# Patient Record
Sex: FEMALE | Race: BLACK OR AFRICAN AMERICAN | NOT HISPANIC OR LATINO | Employment: FULL TIME | URBAN - METROPOLITAN AREA
[De-identification: names, ages, dates, MRNs, and addresses within clinical notes are randomized per-mention and may not be internally consistent; named-entity substitution may affect disease eponyms.]

---

## 2019-04-19 ENCOUNTER — OFFICE VISIT CONVERTED (OUTPATIENT)
Dept: GASTROENTEROLOGY | Facility: CLINIC | Age: 39
End: 2019-04-19
Attending: PHYSICIAN ASSISTANT

## 2019-06-20 ENCOUNTER — HOSPITAL ENCOUNTER (OUTPATIENT)
Dept: LAB | Facility: HOSPITAL | Age: 39
Discharge: HOME | End: 2019-06-20
Attending: INTERNAL MEDICINE

## 2019-06-20 LAB
ALBUMIN SERPL-MCNC: 4 G/DL (ref 3.5–5)
ALBUMIN/GLOB SERPL: 1.1 {RATIO} (ref 1.4–2.6)
ALP SERPL-CCNC: 51 U/L (ref 42–98)
ALT SERPL-CCNC: 14 U/L (ref 10–40)
ANION GAP SERPL CALC-SCNC: 17 MMOL/L (ref 8–19)
AST SERPL-CCNC: 19 U/L (ref 15–50)
BASOPHILS # BLD AUTO: 0.02 10*3/UL (ref 0–0.2)
BASOPHILS NFR BLD AUTO: 0.3 % (ref 0–3)
BILIRUB SERPL-MCNC: 0.39 MG/DL (ref 0.2–1.3)
BNP SERPL-MCNC: 216 PG/ML (ref 0–450)
BUN SERPL-MCNC: 13 MG/DL (ref 5–25)
BUN/CREAT SERPL: 14 {RATIO} (ref 6–20)
CALCIUM SERPL-MCNC: 9.5 MG/DL (ref 8.7–10.4)
CHLORIDE SERPL-SCNC: 97 MMOL/L (ref 99–111)
CONV ABS IMM GRAN: 0.02 10*3/UL (ref 0–0.2)
CONV CO2: 28 MMOL/L (ref 22–32)
CONV IMMATURE GRAN: 0.3 % (ref 0–1.8)
CONV TOTAL PROTEIN: 7.7 G/DL (ref 6.3–8.2)
CREAT UR-MCNC: 0.91 MG/DL (ref 0.5–0.9)
DEPRECATED RDW RBC AUTO: 45.3 FL (ref 36.4–46.3)
EOSINOPHIL # BLD AUTO: 0.04 10*3/UL (ref 0–0.7)
EOSINOPHIL # BLD AUTO: 0.5 % (ref 0–7)
ERYTHROCYTE [DISTWIDTH] IN BLOOD BY AUTOMATED COUNT: 14.6 % (ref 11.7–14.4)
GFR SERPLBLD BASED ON 1.73 SQ M-ARVRAT: >60 ML/MIN/{1.73_M2}
GLOBULIN UR ELPH-MCNC: 3.7 G/DL (ref 2–3.5)
GLUCOSE SERPL-MCNC: 83 MG/DL (ref 65–99)
HBA1C MFR BLD: 11.6 G/DL (ref 12–16)
HCT VFR BLD AUTO: 36.7 % (ref 37–47)
LYMPHOCYTES # BLD AUTO: 2.47 10*3/UL (ref 1–5)
MCH RBC QN AUTO: 27 PG (ref 27–31)
MCHC RBC AUTO-ENTMCNC: 31.6 G/DL (ref 33–37)
MCV RBC AUTO: 85.5 FL (ref 81–99)
MONOCYTES # BLD AUTO: 0.52 10*3/UL (ref 0.2–1.2)
MONOCYTES NFR BLD AUTO: 7.1 % (ref 3–10)
NEUTROPHILS # BLD AUTO: 4.26 10*3/UL (ref 2–8)
NEUTROPHILS NFR BLD AUTO: 58.1 % (ref 30–85)
NRBC CBCN: 0 % (ref 0–0.7)
OSMOLALITY SERPL CALC.SUM OF ELEC: 287 MOSM/KG (ref 273–304)
PLATELET # BLD AUTO: 241 10*3/UL (ref 130–400)
PMV BLD AUTO: 10.7 FL (ref 9.4–12.3)
POTASSIUM SERPL-SCNC: 2.8 MMOL/L (ref 3.5–5.3)
RBC # BLD AUTO: 4.29 10*6/UL (ref 4.2–5.4)
SODIUM SERPL-SCNC: 139 MMOL/L (ref 135–147)
T4 FREE SERPL-MCNC: 1.3 NG/DL (ref 0.9–1.8)
TSH SERPL-ACNC: 0.39 M[IU]/L (ref 0.27–4.2)
VARIANT LYMPHS NFR BLD MANUAL: 33.7 % (ref 20–45)
WBC # BLD AUTO: 7.33 10*3/UL (ref 4.8–10.8)

## 2020-08-17 ENCOUNTER — HOSPITAL ENCOUNTER (OUTPATIENT)
Dept: OTHER | Facility: HOSPITAL | Age: 40
Discharge: HOME | End: 2020-08-17
Attending: INTERNAL MEDICINE

## 2020-08-18 LAB
ALBUMIN SERPL-MCNC: 4.3 G/DL (ref 3.5–5)
ALBUMIN/GLOB SERPL: 1.3 {RATIO} (ref 1.4–2.6)
ALP SERPL-CCNC: 43 U/L (ref 42–98)
ALT SERPL-CCNC: 14 U/L (ref 10–40)
ANION GAP SERPL CALC-SCNC: 18 MMOL/L (ref 8–19)
AST SERPL-CCNC: 23 U/L (ref 15–50)
BILIRUB SERPL-MCNC: 0.34 MG/DL (ref 0.2–1.3)
BUN SERPL-MCNC: 15 MG/DL (ref 5–25)
BUN/CREAT SERPL: 17 {RATIO} (ref 6–20)
CALCIUM SERPL-MCNC: 10.1 MG/DL (ref 8.7–10.4)
CHLORIDE SERPL-SCNC: 100 MMOL/L (ref 99–111)
CHOLEST SERPL-MCNC: 195 MG/DL (ref 107–200)
CHOLEST/HDLC SERPL: 3.1 {RATIO} (ref 3–6)
CONV CO2: 23 MMOL/L (ref 22–32)
CONV TOTAL PROTEIN: 7.6 G/DL (ref 6.3–8.2)
CREAT UR-MCNC: 0.88 MG/DL (ref 0.5–0.9)
GFR SERPLBLD BASED ON 1.73 SQ M-ARVRAT: >60 ML/MIN/{1.73_M2}
GLOBULIN UR ELPH-MCNC: 3.3 G/DL (ref 2–3.5)
GLUCOSE SERPL-MCNC: 69 MG/DL (ref 65–99)
HDLC SERPL-MCNC: 62 MG/DL (ref 40–60)
LDLC SERPL CALC-MCNC: 125 MG/DL (ref 70–100)
OSMOLALITY SERPL CALC.SUM OF ELEC: 283 MOSM/KG (ref 273–304)
POTASSIUM SERPL-SCNC: 4.3 MMOL/L (ref 3.5–5.3)
SODIUM SERPL-SCNC: 137 MMOL/L (ref 135–147)
TRIGL SERPL-MCNC: 41 MG/DL (ref 40–150)
TSH SERPL-ACNC: 0.35 M[IU]/L (ref 0.27–4.2)
VLDLC SERPL-MCNC: 8 MG/DL (ref 5–37)

## 2021-05-15 VITALS
RESPIRATION RATE: 16 BRPM | SYSTOLIC BLOOD PRESSURE: 181 MMHG | HEART RATE: 66 BPM | DIASTOLIC BLOOD PRESSURE: 100 MMHG | WEIGHT: 293 LBS | OXYGEN SATURATION: 100 % | HEIGHT: 69 IN | BODY MASS INDEX: 43.4 KG/M2

## 2021-07-02 ENCOUNTER — TRANSCRIBE ORDERS (OUTPATIENT)
Dept: ADMINISTRATIVE | Facility: HOSPITAL | Age: 41
End: 2021-07-02

## 2021-07-02 DIAGNOSIS — Z12.31 ENCOUNTER FOR MAMMOGRAM TO ESTABLISH BASELINE MAMMOGRAM: Primary | ICD-10-CM

## 2021-08-25 ENCOUNTER — HOSPITAL ENCOUNTER (OUTPATIENT)
Dept: MAMMOGRAPHY | Facility: HOSPITAL | Age: 41
Discharge: HOME | End: 2021-08-25
Admitting: NURSE PRACTITIONER

## 2021-08-25 DIAGNOSIS — Z12.31 ENCOUNTER FOR MAMMOGRAM TO ESTABLISH BASELINE MAMMOGRAM: ICD-10-CM

## 2021-08-25 PROCEDURE — 77067 SCR MAMMO BI INCL CAD: CPT | Performed by: RADIOLOGY

## 2021-08-25 PROCEDURE — 77063 BREAST TOMOSYNTHESIS BI: CPT

## 2021-08-25 PROCEDURE — 77067 SCR MAMMO BI INCL CAD: CPT

## 2021-08-25 PROCEDURE — 77063 BREAST TOMOSYNTHESIS BI: CPT | Performed by: RADIOLOGY

## 2023-02-28 ENCOUNTER — APPOINTMENT (OUTPATIENT)
Dept: GENERAL RADIOLOGY | Facility: HOSPITAL | Age: 43
End: 2023-02-28
Payer: COMMERCIAL

## 2023-02-28 ENCOUNTER — HOSPITAL ENCOUNTER (EMERGENCY)
Facility: HOSPITAL | Age: 43
Discharge: HOME | End: 2023-03-01
Attending: EMERGENCY MEDICINE | Admitting: EMERGENCY MEDICINE
Payer: COMMERCIAL

## 2023-02-28 DIAGNOSIS — E87.6 HYPOKALEMIA: ICD-10-CM

## 2023-02-28 DIAGNOSIS — I10 UNCONTROLLED HYPERTENSION: ICD-10-CM

## 2023-02-28 DIAGNOSIS — R00.2 PALPITATIONS: Primary | ICD-10-CM

## 2023-02-28 LAB
ALBUMIN SERPL-MCNC: 4.7 G/DL (ref 3.5–5.2)
ALBUMIN/GLOB SERPL: 1.2 G/DL
ALP SERPL-CCNC: 54 U/L (ref 39–117)
ALT SERPL W P-5'-P-CCNC: 14 U/L (ref 1–33)
ANION GAP SERPL CALCULATED.3IONS-SCNC: 11.4 MMOL/L (ref 5–15)
AST SERPL-CCNC: 19 U/L (ref 1–32)
BASOPHILS # BLD AUTO: 0.01 10*3/MM3 (ref 0–0.2)
BASOPHILS NFR BLD AUTO: 0.1 % (ref 0–1.5)
BILIRUB SERPL-MCNC: 0.2 MG/DL (ref 0–1.2)
BUN SERPL-MCNC: 14 MG/DL (ref 6–20)
BUN/CREAT SERPL: 16.3 (ref 7–25)
CALCIUM SPEC-SCNC: 11.2 MG/DL (ref 8.6–10.5)
CHLORIDE SERPL-SCNC: 97 MMOL/L (ref 98–107)
CO2 SERPL-SCNC: 31.6 MMOL/L (ref 22–29)
CREAT SERPL-MCNC: 0.86 MG/DL (ref 0.57–1)
DEPRECATED RDW RBC AUTO: 46.8 FL (ref 37–54)
EGFRCR SERPLBLD CKD-EPI 2021: 85.6 ML/MIN/1.73
EOSINOPHIL # BLD AUTO: 0.06 10*3/MM3 (ref 0–0.4)
EOSINOPHIL NFR BLD AUTO: 0.7 % (ref 0.3–6.2)
ERYTHROCYTE [DISTWIDTH] IN BLOOD BY AUTOMATED COUNT: 15.8 % (ref 12.3–15.4)
GLOBULIN UR ELPH-MCNC: 3.9 GM/DL
GLUCOSE SERPL-MCNC: 92 MG/DL (ref 65–99)
HCT VFR BLD AUTO: 35.6 % (ref 34–46.6)
HGB BLD-MCNC: 11.6 G/DL (ref 12–15.9)
HOLD SPECIMEN: NORMAL
HOLD SPECIMEN: NORMAL
IMM GRANULOCYTES # BLD AUTO: 0.03 10*3/MM3 (ref 0–0.05)
IMM GRANULOCYTES NFR BLD AUTO: 0.4 % (ref 0–0.5)
LYMPHOCYTES # BLD AUTO: 2.36 10*3/MM3 (ref 0.7–3.1)
LYMPHOCYTES NFR BLD AUTO: 28.8 % (ref 19.6–45.3)
MAGNESIUM SERPL-MCNC: 1.7 MG/DL (ref 1.6–2.6)
MCH RBC QN AUTO: 26.5 PG (ref 26.6–33)
MCHC RBC AUTO-ENTMCNC: 32.6 G/DL (ref 31.5–35.7)
MCV RBC AUTO: 81.5 FL (ref 79–97)
MONOCYTES # BLD AUTO: 0.5 10*3/MM3 (ref 0.1–0.9)
MONOCYTES NFR BLD AUTO: 6.1 % (ref 5–12)
NEUTROPHILS NFR BLD AUTO: 5.23 10*3/MM3 (ref 1.7–7)
NEUTROPHILS NFR BLD AUTO: 63.9 % (ref 42.7–76)
NRBC BLD AUTO-RTO: 0 /100 WBC (ref 0–0.2)
PLATELET # BLD AUTO: 306 10*3/MM3 (ref 140–450)
PMV BLD AUTO: 9.5 FL (ref 6–12)
POTASSIUM SERPL-SCNC: 2.8 MMOL/L (ref 3.5–5.2)
PROT SERPL-MCNC: 8.6 G/DL (ref 6–8.5)
RBC # BLD AUTO: 4.37 10*6/MM3 (ref 3.77–5.28)
SODIUM SERPL-SCNC: 140 MMOL/L (ref 136–145)
TROPONIN T SERPL HS-MCNC: 11 NG/L
WBC NRBC COR # BLD: 8.19 10*3/MM3 (ref 3.4–10.8)
WHOLE BLOOD HOLD COAG: NORMAL
WHOLE BLOOD HOLD SPECIMEN: NORMAL

## 2023-02-28 PROCEDURE — 83735 ASSAY OF MAGNESIUM: CPT | Performed by: EMERGENCY MEDICINE

## 2023-02-28 PROCEDURE — 96374 THER/PROPH/DIAG INJ IV PUSH: CPT

## 2023-02-28 PROCEDURE — 93005 ELECTROCARDIOGRAM TRACING: CPT | Performed by: EMERGENCY MEDICINE

## 2023-02-28 PROCEDURE — 85025 COMPLETE CBC W/AUTO DIFF WBC: CPT | Performed by: EMERGENCY MEDICINE

## 2023-02-28 PROCEDURE — 80053 COMPREHEN METABOLIC PANEL: CPT | Performed by: EMERGENCY MEDICINE

## 2023-02-28 PROCEDURE — 84484 ASSAY OF TROPONIN QUANT: CPT | Performed by: EMERGENCY MEDICINE

## 2023-02-28 PROCEDURE — 93005 ELECTROCARDIOGRAM TRACING: CPT

## 2023-02-28 PROCEDURE — 93010 ELECTROCARDIOGRAM REPORT: CPT | Performed by: SPECIALIST

## 2023-02-28 PROCEDURE — 71045 X-RAY EXAM CHEST 1 VIEW: CPT

## 2023-02-28 PROCEDURE — 25010000002 HYDRALAZINE PER 20 MG: Performed by: EMERGENCY MEDICINE

## 2023-02-28 PROCEDURE — 36415 COLL VENOUS BLD VENIPUNCTURE: CPT | Performed by: EMERGENCY MEDICINE

## 2023-02-28 PROCEDURE — 99284 EMERGENCY DEPT VISIT MOD MDM: CPT

## 2023-02-28 RX ORDER — HYDRALAZINE HYDROCHLORIDE 10 MG/1
10 TABLET, FILM COATED ORAL 4 TIMES DAILY
Qty: 28 TABLET | Refills: 0 | Status: SHIPPED | OUTPATIENT
Start: 2023-02-28 | End: 2023-03-22

## 2023-02-28 RX ORDER — SODIUM CHLORIDE 0.9 % (FLUSH) 0.9 %
10 SYRINGE (ML) INJECTION AS NEEDED
Status: DISCONTINUED | OUTPATIENT
Start: 2023-02-28 | End: 2023-03-01 | Stop reason: HOSPADM

## 2023-02-28 RX ORDER — POTASSIUM CHLORIDE 750 MG/1
40 CAPSULE, EXTENDED RELEASE ORAL ONCE
Status: COMPLETED | OUTPATIENT
Start: 2023-02-28 | End: 2023-02-28

## 2023-02-28 RX ORDER — CLONIDINE HYDROCHLORIDE 0.1 MG/1
TABLET ORAL
COMMUNITY
Start: 2023-02-23 | End: 2023-03-22

## 2023-02-28 RX ORDER — HYDROCHLOROTHIAZIDE 25 MG/1
TABLET ORAL
COMMUNITY
Start: 2023-02-12 | End: 2023-03-22

## 2023-02-28 RX ORDER — AMLODIPINE BESYLATE 10 MG/1
TABLET ORAL
COMMUNITY
Start: 2023-02-19 | End: 2023-03-22 | Stop reason: SDUPTHER

## 2023-02-28 RX ORDER — HYDRALAZINE HYDROCHLORIDE 20 MG/ML
20 INJECTION INTRAMUSCULAR; INTRAVENOUS ONCE
Status: COMPLETED | OUTPATIENT
Start: 2023-02-28 | End: 2023-02-28

## 2023-02-28 RX ORDER — METOPROLOL SUCCINATE 50 MG/1
100 TABLET, EXTENDED RELEASE ORAL DAILY
COMMUNITY
Start: 2023-02-05

## 2023-02-28 RX ORDER — FERROUS SULFATE 325(65) MG
1 TABLET ORAL 3 TIMES DAILY
COMMUNITY
Start: 2022-12-02

## 2023-02-28 RX ORDER — LISINOPRIL 40 MG/1
TABLET ORAL
COMMUNITY
Start: 2023-02-27

## 2023-02-28 RX ADMIN — POTASSIUM CHLORIDE 40 MEQ: 10 CAPSULE, COATED, EXTENDED RELEASE ORAL at 23:58

## 2023-02-28 RX ADMIN — HYDRALAZINE HYDROCHLORIDE 20 MG: 20 INJECTION, SOLUTION INTRAMUSCULAR; INTRAVENOUS at 23:56

## 2023-03-01 VITALS
HEIGHT: 69 IN | HEART RATE: 66 BPM | DIASTOLIC BLOOD PRESSURE: 84 MMHG | OXYGEN SATURATION: 98 % | WEIGHT: 293 LBS | SYSTOLIC BLOOD PRESSURE: 148 MMHG | BODY MASS INDEX: 43.4 KG/M2 | TEMPERATURE: 98.3 F | RESPIRATION RATE: 20 BRPM

## 2023-03-01 LAB — QT INTERVAL: 422 MS

## 2023-03-01 NOTE — ED PROVIDER NOTES
Time: 10:26 PM EST  Date of encounter:  2023  Independent Historian/Clinical History and Information was obtained by:   Patient  Chief Complaint: Nausea and palpitations    History is limited by: N/A    History of Present Illness:  Patient is a 43 y.o. year old female who presents to the emergency department for evaluation of nausea and palpitations. Pt states she was placed on new BP medication clonidine and feels she is dehydrated and is having palpitations. Pt also notes some lightheadedness. Pt has been taking this medicine for a couple of weeks now. Pt is currently prescribed with 5 HTN medications. Pt is still making urine.     HPI    Patient Care Team  Primary Care Provider: Rod Rain MD    Past Medical History:     Allergies   Allergen Reactions   • Penicillins Swelling     Past Medical History:   Diagnosis Date   • Anemia    • Hypertension      Past Surgical History:   Procedure Laterality Date   •  SECTION     • TUBAL ABDOMINAL LIGATION       History reviewed. No pertinent family history.    Home Medications:  Prior to Admission medications    Medication Sig Start Date End Date Taking? Authorizing Provider   amLODIPine (NORVASC) 10 MG tablet TAKE 1 TABLET BY MOUTH EVERY DAY FOR 90 DAYS 23   Jazmyn Sargent MD   cloNIDine (CATAPRES) 0.1 MG tablet TAKE 1 TABLET BY MOUTH TWICE A DAY FOR 30 DAYS 23   Jazmyn Sargent MD   ferrous sulfate 325 (65 FE) MG tablet Take 1 tablet by mouth 3 (Three) Times a Day. 22   Jazmyn Sargent MD   hydroCHLOROthiazide (HYDRODIURIL) 25 MG tablet TAKE 1 TABLET BY MOUTH EVERY DAY FOR 90 DAYS 23   Jazmyn Sargent MD   lisinopril (PRINIVIL,ZESTRIL) 40 MG tablet  23   Jazmyn Sargent MD   metoprolol succinate XL (TOPROL-XL) 50 MG 24 hr tablet Take 2 tablets by mouth Daily. with food 23   Jazmyn Sargent MD        Social History:   Social History     Tobacco Use   • Smoking status: Never   Substance Use  "Topics   • Alcohol use: Never   • Drug use: Never         Review of Systems:  Review of Systems   Constitutional: Negative for chills and fever.   HENT: Negative for congestion, rhinorrhea and sore throat.    Eyes: Negative for pain and visual disturbance.   Respiratory: Negative for apnea, cough, chest tightness and shortness of breath.    Cardiovascular: Positive for palpitations. Negative for chest pain.   Gastrointestinal: Positive for nausea. Negative for abdominal pain, diarrhea and vomiting.   Genitourinary: Negative for difficulty urinating and dysuria.   Musculoskeletal: Negative for joint swelling and myalgias.   Skin: Negative for color change.   Neurological: Positive for light-headedness. Negative for seizures and headaches.   Psychiatric/Behavioral: Negative.    All other systems reviewed and are negative.       Physical Exam:  /84   Pulse 66   Temp 98.3 °F (36.8 °C) (Oral)   Resp 20   Ht 175.3 cm (69\")   Wt (!) 158 kg (347 lb 7.1 oz)   LMP 02/23/2023   SpO2 98%   BMI 51.31 kg/m²     Physical Exam  Vitals and nursing note reviewed.   Constitutional:       General: She is not in acute distress.     Appearance: Normal appearance. She is not toxic-appearing.   HENT:      Head: Normocephalic and atraumatic.      Jaw: There is normal jaw occlusion.   Eyes:      General: Lids are normal.      Extraocular Movements: Extraocular movements intact.      Conjunctiva/sclera: Conjunctivae normal.      Pupils: Pupils are equal, round, and reactive to light.   Cardiovascular:      Rate and Rhythm: Normal rate and regular rhythm.      Pulses: Normal pulses.      Heart sounds: Murmur heard.    Systolic murmur is present with a grade of 2/6.  Pulmonary:      Effort: Pulmonary effort is normal. No respiratory distress.      Breath sounds: Normal breath sounds. No wheezing or rhonchi.   Abdominal:      General: Abdomen is flat.      Palpations: Abdomen is soft.      Tenderness: There is no abdominal " tenderness. There is no guarding or rebound.   Musculoskeletal:         General: Normal range of motion.      Cervical back: Normal range of motion and neck supple.      Right lower le+ Edema present.      Left lower le+ Edema present.   Skin:     General: Skin is warm and dry.   Neurological:      Mental Status: She is alert and oriented to person, place, and time. Mental status is at baseline.   Psychiatric:         Mood and Affect: Mood normal.                  Procedures:  Procedures      Medical Decision Making:      Comorbidities that affect care:    Anemia, Hypertension    External Notes reviewed:    Previous ED Note      The following orders were placed and all results were independently analyzed by me:  Orders Placed This Encounter   Procedures   • XR Chest 1 View   • [place] Draw   • Comprehensive Metabolic Panel   • Magnesium   • Single High Sensitivity Troponin T   • CBC Auto Differential   • NPO Diet NPO Type: Strict NPO   • Undress & Gown   • Cardiac Monitoring   • Continuous Pulse Oximetry   • Oxygen Therapy- Nasal Cannula; 2 LPM; Titrate for SPO2: 92%   • ECG 12 Lead ED Triage Standing Order; Dysrhythmia   • Insert Peripheral IV   • CBC & Differential   • Green Top (Gel)   • Lavender Top   • Gold Top - SST   • Light Blue Top       Medications Given in the Emergency Department:  Medications   sodium chloride 0.9 % flush 10 mL (has no administration in time range)   potassium chloride (MICRO-K) CR capsule 40 mEq (40 mEq Oral Given 23)   hydrALAZINE (APRESOLINE) injection 20 mg (20 mg Intravenous Given 23)        ED Course:         Labs:    Lab Results (last 24 hours)     Procedure Component Value Units Date/Time    CBC & Differential [[id number]]  (Abnormal) Collected: 23    Specimen: Blood Updated: 23    Narrative:      The following orders were created for panel order CBC & Differential.  Procedure                               Abnormality          Status                     ---------                               -----------         ------                     CBC Auto Differential[[id number]]        Abnormal            Final result                 Please view results for these tests on the individual orders.    Comprehensive Metabolic Panel [[id number]]  (Abnormal) Collected: 02/28/23 2222    Specimen: Blood Updated: 02/28/23 2304     Glucose 92 mg/dL      BUN 14 mg/dL      Creatinine 0.86 mg/dL      Sodium 140 mmol/L      Potassium 2.8 mmol/L      Comment: Slight hemolysis detected by analyzer. Results may be affected.        Chloride 97 mmol/L      CO2 31.6 mmol/L      Calcium 11.2 mg/dL      Total Protein 8.6 g/dL      Albumin 4.7 g/dL      ALT (SGPT) 14 U/L      AST (SGOT) 19 U/L      Alkaline Phosphatase 54 U/L      Total Bilirubin 0.2 mg/dL      Globulin 3.9 gm/dL      A/G Ratio 1.2 g/dL      BUN/Creatinine Ratio 16.3     Anion Gap 11.4 mmol/L      eGFR 85.6 mL/min/1.73     Narrative:      GFR Normal >60  Chronic Kidney Disease <60  Kidney Failure <15      Magnesium [[id number]]  (Normal) Collected: 02/28/23 2222    Specimen: Blood Updated: 02/28/23 2304     Magnesium 1.7 mg/dL     Single High Sensitivity Troponin T [[id number]]  (Abnormal) Collected: 02/28/23 2222    Specimen: Blood Updated: 02/28/23 2303     HS Troponin T 11 ng/L     Narrative:      High Sensitive Troponin T Reference Range:  <10.0 ng/L- Negative Female for AMI  <15.0 ng/L- Negative Male for AMI  >=10 - Abnormal Female indicating possible myocardial injury.  >=15 - Abnormal Male indicating possible myocardial injury.   Clinicians would have to utilize clinical acumen, EKG, Troponin, and serial changes to determine if it is an Acute Myocardial Infarction or myocardial injury due to an underlying chronic condition.         CBC Auto Differential [[id number]]  (Abnormal) Collected: 02/28/23 2222    Specimen: Blood Updated: 02/28/23 2239     WBC 8.19 10*3/mm3      RBC 4.37 10*6/mm3       Hemoglobin 11.6 g/dL      Hematocrit 35.6 %      MCV 81.5 fL      MCH 26.5 pg      MCHC 32.6 g/dL      RDW 15.8 %      RDW-SD 46.8 fl      MPV 9.5 fL      Platelets 306 10*3/mm3      Neutrophil % 63.9 %      Lymphocyte % 28.8 %      Monocyte % 6.1 %      Eosinophil % 0.7 %      Basophil % 0.1 %      Immature Grans % 0.4 %      Neutrophils, Absolute 5.23 10*3/mm3      Lymphocytes, Absolute 2.36 10*3/mm3      Monocytes, Absolute 0.50 10*3/mm3      Eosinophils, Absolute 0.06 10*3/mm3      Basophils, Absolute 0.01 10*3/mm3      Immature Grans, Absolute 0.03 10*3/mm3      nRBC 0.0 /100 WBC            Imaging:    XR Chest 1 View    Result Date: 2/28/2023  PROCEDURE: XR CHEST 1 VW  COMPARISON: 11/30/2013.  INDICATIONS: 43-year-old female w/ h/o dysrhythmia(s), cardiac palpitations (5 days), shortness of breath, dizziness, & headache.  FINDINGS:  A single AP (or PA) upright portable chest radiograph was performed.  Borderline cardiac enlargement is seen.  No acute infiltrate is appreciated.  No pleural effusion or pneumothorax is identified.  The thoracic aorta is atherosclerotic and mildly ectatic. There is pulmonary hypoinflation.  Otherwise, no significant interval change is seen since the prior study (or studies).        No acute infiltrate is appreciated.     Please note that portions of this note were completed with a voice recognition program.  JEAN PAUL MONTELONGO JR, MD       Electronically Signed and Approved By: JEAN PAUL MONTELONGO JR, MD on 2/28/2023 at 22:04                  Differential Diagnosis and Discussion:    Palpitations: Differential diagnosis includes but is not limited to anxiety, atrioventricular blocks, mitral valve disease, hypoxia, coronary artery disease, hypokalemia, anemia, fever, COPD, congestive heart failure, pericarditis, Milvia-Parkinson-White syndrome, pulmonary embolism, SVT, atrial fibrillation, atrial flutter, sinus tachycardia, thyrotoxicosis, and pheochromocytoma.    All labs were reviewed and  interpreted by me.  All X-rays were independently reviewed by me.    MDM  Number of Diagnoses or Management Options  Hypokalemia  Palpitations  Uncontrolled hypertension  Diagnosis management comments: In summary this is a 43-year-old female who presents to the emergency department for evaluation of palpitations, not feeling well.  CBC independently reviewed by me and shows no critical abnormalities.  CMP independently reviewed by me and shows no critical abnormalities except hypokalemia..  Magnesium unremarkable.  Patient is otherwise well-appearing in no acute distress.  I instructed her to stop clonidine as seems to be adversely affecting her and have started hydralazine on her.  Of also instructed her to follow-up with cardiology for further management of her uncontrolled hypertension.  Very strict return to ER and follow-up instructions have been provided to the patient.             Patient Care Considerations:    CONSULT: I considered consulting Cardiology, however Patient will see them as an outpatient.      Consultants/Shared Management Plan:    None    Social Determinants of Health:    Patient is independent, reliable, and has access to care.       Disposition and Care Coordination:    Discharged: The patient is suitable and stable for discharge with no need for consideration of observation or admission.    I have explained the patient´s condition, diagnoses and treatment plan based on the information available to me at this time. I have answered questions and addressed any concerns. The patient has a good  understanding of the patient´s diagnosis, condition, and treatment plan as can be expected at this point. The vital signs have been stable. The patient´s condition is stable and appropriate for discharge from the emergency department.      The patient will pursue further outpatient evaluation with the primary care physician or other designated or consulting physician as outlined in the discharge  instructions. They are agreeable to this plan of care and follow-up instructions have been explained in detail. The patient has received these instructions in written format and have expressed an understanding of the discharge instructions. The patient is aware that any significant change in condition or worsening of symptoms should prompt an immediate return to this or the closest emergency department or call to 911.  I have explained discharge medications and the need for follow up with the patient/caretakers. This was also printed in the discharge instructions. Patient was discharged with the following medications and follow up:      Medication List      New Prescriptions    hydrALAZINE 10 MG tablet  Commonly known as: APRESOLINE  Take 1 tablet by mouth 4 (Four) Times a Day for 7 days.           Where to Get Your Medications      These medications were sent to [facility]/pharmacy #[id number] - [place], [place] - [address] - [phone]  - [phone]   [address] [place] [address] [place] [address]    Hours: 24-hours Phone: [phone]   · hydrALAZINE 10 MG tablet      Viky Holbrook MD  [address] [place] [address]  [phone]    In 1 week         Final diagnoses:   Palpitations   Hypokalemia   Uncontrolled hypertension        ED Disposition     ED Disposition   Discharge    Condition   Stable    Comment   --             This medical record created using voice recognition software.        Documentation assistance provided by Asya Damian acting as scribe for No att. providers found. Information recorded by the scribe was done at my direction and has been verified and validated by me.          Asya Damian  02/28/23 5721       Mateo Price MD  03/01/23 0019

## 2023-03-22 ENCOUNTER — OFFICE VISIT (OUTPATIENT)
Dept: CARDIOLOGY | Facility: CLINIC | Age: 43
End: 2023-03-22
Payer: COMMERCIAL

## 2023-03-22 VITALS
HEART RATE: 58 BPM | SYSTOLIC BLOOD PRESSURE: 168 MMHG | BODY MASS INDEX: 43.4 KG/M2 | DIASTOLIC BLOOD PRESSURE: 105 MMHG | WEIGHT: 293 LBS | HEIGHT: 69 IN

## 2023-03-22 DIAGNOSIS — R06.09 DYSPNEA ON EXERTION: ICD-10-CM

## 2023-03-22 DIAGNOSIS — E66.01 MORBID (SEVERE) OBESITY DUE TO EXCESS CALORIES: ICD-10-CM

## 2023-03-22 DIAGNOSIS — I10 HYPERTENSION, ESSENTIAL: Primary | ICD-10-CM

## 2023-03-22 PROBLEM — K59.00 CONSTIPATION: Status: ACTIVE | Noted: 2023-03-22

## 2023-03-22 PROBLEM — D50.0 IRON DEFICIENCY ANEMIA DUE TO CHRONIC BLOOD LOSS: Status: ACTIVE | Noted: 2023-03-22

## 2023-03-22 PROBLEM — N92.4 PREMENOPAUSAL MENORRHAGIA: Status: ACTIVE | Noted: 2023-03-22

## 2023-03-22 PROBLEM — D50.9 MICROCYTIC ANEMIA: Status: ACTIVE | Noted: 2023-03-22

## 2023-03-22 PROCEDURE — 99204 OFFICE O/P NEW MOD 45 MIN: CPT | Performed by: INTERNAL MEDICINE

## 2023-03-22 RX ORDER — METOPROLOL SUCCINATE 25 MG/1
TABLET, EXTENDED RELEASE ORAL
COMMUNITY
End: 2023-03-22

## 2023-03-22 RX ORDER — HYDRALAZINE HYDROCHLORIDE 25 MG/1
50 TABLET, FILM COATED ORAL 3 TIMES DAILY
Qty: 270 TABLET | Refills: 3 | Status: SHIPPED | OUTPATIENT
Start: 2023-03-22 | End: 2023-03-29

## 2023-03-22 RX ORDER — LISINOPRIL 20 MG/1
TABLET ORAL
COMMUNITY
End: 2023-03-22

## 2023-03-22 RX ORDER — ACETAMINOPHEN 160 MG
TABLET,DISINTEGRATING ORAL EVERY 24 HOURS
COMMUNITY

## 2023-03-22 RX ORDER — HYDROCHLOROTHIAZIDE 25 MG/1
25 TABLET ORAL DAILY
Qty: 90 TABLET | Refills: 3 | Status: SHIPPED | OUTPATIENT
Start: 2023-03-22

## 2023-03-22 RX ORDER — POTASSIUM CHLORIDE 600 MG/1
TABLET, FILM COATED, EXTENDED RELEASE ORAL
COMMUNITY
Start: 2023-03-16

## 2023-03-22 RX ORDER — AMLODIPINE BESYLATE 10 MG/1
TABLET ORAL
COMMUNITY

## 2023-03-22 RX ORDER — DOCUSATE CALCIUM 240 MG
CAPSULE ORAL EVERY 24 HOURS
COMMUNITY

## 2023-03-22 NOTE — PROGRESS NOTES
Chief Complaint  Establish Care (ER told her to come over here for blood pressure )    Subjective            Kaity Merritt presents to [facility] CARDIOLOGY  History of Present Illness    43-year-old -American female.  She has no previous cardiac problems in the past.  She is referred for hypertension.  She has had hypertension for years.  Her home blood pressure readings despite recent medication changes have been in the 140s over 90s or higher.  She has mild dyspnea.  She was having some palpitations, but since stopping clonidine those have resolved.  She has some lower extremity edema but it has not been improved recently with compression stockings.    PMH  Past Medical History:   Diagnosis Date   • Anemia    • Hypertension          SURGICALHX  Past Surgical History:   Procedure Laterality Date   •  SECTION     • TUBAL ABDOMINAL LIGATION          SOC  Social History     Socioeconomic History   • Marital status:    Tobacco Use   • Smoking status: Never   • Smokeless tobacco: Never   Vaping Use   • Vaping Use: Never used   Substance and Sexual Activity   • Alcohol use: Never   • Drug use: Never   • Sexual activity: Defer         FAMHX  Family History   Problem Relation Age of Onset   • Hypertension Mother    • Hypertension Maternal Grandmother    • Heart disease Maternal Grandfather    • Heart attack Neg Hx    • Heart failure Neg Hx    • Hyperlipidemia Neg Hx           ALLERGY  Allergies   Allergen Reactions   • Penicillins Swelling        MEDSCURRENT    Current Outpatient Medications:   •  amLODIPine (NORVASC) 10 MG tablet, amlodipine 10 mg oral tablet take 1 tablet (10 mg) by oral route once daily   Active, Disp: , Rfl:   •  Cholecalciferol (Vitamin D3) 50 MCG (2000) capsule, Daily., Disp: , Rfl:   •  ferrous sulfate 325 (65 FE) MG tablet, Take 1 tablet by mouth 3 (Three) Times a Day., Disp: , Rfl:   •  hydrALAZINE (APRESOLINE) 25 MG tablet, Take 2 tablets by mouth 3  "(Three) Times a Day for 7 days., Disp: 270 tablet, Rfl: 3  •  hydroCHLOROthiazide (HYDRODIURIL) 25 MG tablet, Take 1 tablet by mouth Daily., Disp: 90 tablet, Rfl: 3  •  lisinopril (PRINIVIL,ZESTRIL) 40 MG tablet, , Disp: , Rfl:   •  metoprolol succinate XL (TOPROL-XL) 50 MG 24 hr tablet, Take 2 tablets by mouth Daily. with food, Disp: , Rfl:   •  potassium chloride (KLOR-CON) 8 MEQ CR tablet, TAKE 1 TABLET BY MOUTH THREE TIMES A DAY FOR 90 DAYS, Disp: , Rfl:   •  docusate calcium (SURFAK) 240 MG capsule, Daily. (Patient not taking: Reported on 3/22/2023), Disp: , Rfl:       Review of Systems   Constitutional: Positive for weight gain.   HENT: Negative.    Eyes: Negative.    Cardiovascular: Positive for dyspnea on exertion and leg swelling. Negative for chest pain.   Respiratory: Positive for shortness of breath.    Endocrine: Negative.    Hematologic/Lymphatic: Negative.    Skin: Negative.    Musculoskeletal: Negative.    Gastrointestinal: Negative.    Genitourinary: Negative.    Neurological: Negative.    Psychiatric/Behavioral: Negative.         Objective     BP (!) 168/105   Pulse 58   Ht 175.3 cm (69\")   Wt (!) 156 kg (343 lb 3.2 oz)   BMI 50.68 kg/m²       General Appearance:   · well developed  · well nourished, morbidly obese  HENT:   · oropharynx moist  · lips not cyanotic  Neck:  · thyroid not enlarged  · supple  Respiratory:  · no respiratory distress  · normal breath sounds  · no rales  Cardiovascular:  · no jugular venous distention  · regular rhythm  · apical impulse normal  · S1 normal, S2 normal  · no S3, no S4   · no murmur  · no rub, no thrill  · carotid pulses normal; no bruit  · pedal pulses normal  · lower extremity edema: Trace  Musculoskeletal:  · no clubbing of fingers.   · normocephalic, head atraumatic  Skin:   · warm, dry  Psychiatric:  · judgement and insight appropriate  · normal mood and affect      Result Review :     The following data was reviewed by: Darvin Calhoun, " MD on 03/22/2023:    CMP    CMP 2/28/23   Glucose 92   BUN 14   Creatinine 0.86   eGFR 85.6   Sodium 140   Potassium 2.8 (A)   Chloride 97 (A)   Calcium 11.2 (A)   Total Protein 8.6 (A)   Albumin 4.7   Globulin 3.9   Total Bilirubin 0.2   Alkaline Phosphatase 54   AST (SGOT) 19   ALT (SGPT) 14   Albumin/Globulin Ratio 1.2   BUN/Creatinine Ratio 16.3   Anion Gap 11.4   (A) Abnormal value       Comments are available for some flowsheets but are not being displayed.           CBC    CBC 2/28/23   WBC 8.19   RBC 4.37   Hemoglobin 11.6 (A)   Hematocrit 35.6   MCV 81.5   MCH 26.5 (A)   MCHC 32.6   RDW 15.8 (A)   Platelets 306   (A) Abnormal value                      Data reviewed: Primary care records reviewed, emergency room records reviewed.  EKG personally reviewed by me from February 28 showing sinus rhythm rate 64, LVH, no ST changes, no previous for comparison     Procedures           Assessment and Plan        ASSESSMENT:  Encounter Diagnoses   Name Primary?   • Hypertension, essential Yes   • Dyspnea on exertion    • Morbid (severe) obesity due to excess calories (HCC)          PLAN:    1.  Essential hypertension-uncontrolled.  Given her current medication regimen I am going to resume the hydralazine at 25 mg 3 times daily.  Continue hydrochlorothiazide, ACE inhibitor, beta-blocker, and amlodipine.  We discussed low-sodium nutrition.  Continue efforts to lose weight.  Blood pressure check will be scheduled in about 3 months otherwise  2.  Dyspnea on exertion-likely due to multiple factors, an echo will be scheduled to evaluate further.  3.  Morbid obesity due to excess calories-counseled, she is in the process of changing nutrition and has started losing a bit of weight already    Follow-up to be arranged          Patient was given instructions and counseling regarding her condition or for health maintenance advice. Please see specific information pulled into the AVS if appropriate.             CALI Calhoun,  MD  3/22/2023    10:00 EDT

## 2023-05-02 ENCOUNTER — HOSPITAL ENCOUNTER (OUTPATIENT)
Dept: CARDIOLOGY | Facility: HOSPITAL | Age: 43
Discharge: HOME | End: 2023-05-02
Admitting: INTERNAL MEDICINE
Payer: COMMERCIAL

## 2023-05-02 DIAGNOSIS — I10 HYPERTENSION, ESSENTIAL: ICD-10-CM

## 2023-05-02 DIAGNOSIS — R06.09 DYSPNEA ON EXERTION: ICD-10-CM

## 2023-05-02 PROCEDURE — 93306 TTE W/DOPPLER COMPLETE: CPT | Performed by: INTERNAL MEDICINE

## 2023-05-02 PROCEDURE — 93306 TTE W/DOPPLER COMPLETE: CPT

## 2023-05-03 LAB
ASCENDING AORTA: 3.5 CM
BH CV ECHO MEAS - AO MAX PG: 18 MMHG
BH CV ECHO MEAS - AO MEAN PG: 10 MMHG
BH CV ECHO MEAS - AO ROOT DIAM: 2.9 CM
BH CV ECHO MEAS - AO V2 MAX: 211 CM/SEC
BH CV ECHO MEAS - AO V2 VTI: 48.4 CM
BH CV ECHO MEAS - AVA(I,D): 2.17 CM2
BH CV ECHO MEAS - EDV(CUBED): 135 ML
BH CV ECHO MEAS - EDV(MOD-SP2): 99.1 ML
BH CV ECHO MEAS - EDV(MOD-SP4): 115 ML
BH CV ECHO MEAS - EF(MOD-BP): 63.9 %
BH CV ECHO MEAS - EF(MOD-SP2): 61.8 %
BH CV ECHO MEAS - EF(MOD-SP4): 69.4 %
BH CV ECHO MEAS - ESV(CUBED): 27.3 ML
BH CV ECHO MEAS - ESV(MOD-SP2): 37.9 ML
BH CV ECHO MEAS - ESV(MOD-SP4): 35.2 ML
BH CV ECHO MEAS - FS: 41.3 %
BH CV ECHO MEAS - IVS/LVPW: 1.11 CM
BH CV ECHO MEAS - IVSD: 1.75 CM
BH CV ECHO MEAS - LA DIMENSION: 4.8 CM
BH CV ECHO MEAS - LAT PEAK E' VEL: 12.8 CM/SEC
BH CV ECHO MEAS - LV DIASTOLIC VOL/BSA (35-75): 44.3 CM2
BH CV ECHO MEAS - LV MASS(C)D: 390.3 GRAMS
BH CV ECHO MEAS - LV MAX PG: 8.4 MMHG
BH CV ECHO MEAS - LV MEAN PG: 5 MMHG
BH CV ECHO MEAS - LV SYSTOLIC VOL/BSA (12-30): 13.5 CM2
BH CV ECHO MEAS - LV V1 MAX: 145 CM/SEC
BH CV ECHO MEAS - LV V1 VTI: 33.4 CM
BH CV ECHO MEAS - LVIDD: 5.1 CM
BH CV ECHO MEAS - LVIDS: 3 CM
BH CV ECHO MEAS - LVOT AREA: 3.1 CM2
BH CV ECHO MEAS - LVOT DIAM: 2 CM
BH CV ECHO MEAS - LVPWD: 1.57 CM
BH CV ECHO MEAS - MED PEAK E' VEL: 9.3 CM/SEC
BH CV ECHO MEAS - MV A MAX VEL: 60.7 CM/SEC
BH CV ECHO MEAS - MV DEC SLOPE: 493.5 CM/SEC2
BH CV ECHO MEAS - MV DEC TIME: 0.24 MSEC
BH CV ECHO MEAS - MV E MAX VEL: 94.6 CM/SEC
BH CV ECHO MEAS - MV E/A: 1.56
BH CV ECHO MEAS - MV MEAN PG: 2 MMHG
BH CV ECHO MEAS - MV P1/2T: 71.8 MSEC
BH CV ECHO MEAS - MV V2 VTI: 31.5 CM
BH CV ECHO MEAS - MVA(P1/2T): 3.1 CM2
BH CV ECHO MEAS - MVA(VTI): 3.3 CM2
BH CV ECHO MEAS - RAP SYSTOLE: 3 MMHG
BH CV ECHO MEAS - RVDD: 2.6 CM
BH CV ECHO MEAS - RVSP: 45.8 MMHG
BH CV ECHO MEAS - SI(MOD-SP2): 23.6 ML/M2
BH CV ECHO MEAS - SI(MOD-SP4): 30.7 ML/M2
BH CV ECHO MEAS - SV(LVOT): 104.9 ML
BH CV ECHO MEAS - SV(MOD-SP2): 61.2 ML
BH CV ECHO MEAS - SV(MOD-SP4): 79.8 ML
BH CV ECHO MEAS - TR MAX PG: 42.8 MMHG
BH CV ECHO MEAS - TR MAX VEL: 327 CM/SEC
BH CV ECHO MEASUREMENTS AVERAGE E/E' RATIO: 8.56
IVRT: 85 MSEC
LEFT ATRIUM VOLUME INDEX: 45 ML/M2
MAXIMAL PREDICTED HEART RATE: 176 BPM
STRESS TARGET HR: 150 BPM

## 2023-12-01 RX ORDER — HYDRALAZINE HYDROCHLORIDE 25 MG/1
TABLET, FILM COATED ORAL
Qty: 270 TABLET | Refills: 3 | OUTPATIENT
Start: 2023-12-01

## 2023-12-12 RX ORDER — HYDRALAZINE HYDROCHLORIDE 25 MG/1
TABLET, FILM COATED ORAL
Qty: 270 TABLET | Refills: 3 | OUTPATIENT
Start: 2023-12-12

## 2023-12-28 ENCOUNTER — TRANSCRIBE ORDERS (OUTPATIENT)
Dept: ADMINISTRATIVE | Facility: HOSPITAL | Age: 43
End: 2023-12-28

## 2023-12-28 DIAGNOSIS — R79.89 LOW THYROID STIMULATING HORMONE (TSH) LEVEL: Primary | ICD-10-CM

## 2024-02-21 ENCOUNTER — HOSPITAL ENCOUNTER (OUTPATIENT)
Dept: NUCLEAR MEDICINE | Facility: HOSPITAL | Age: 44
Discharge: HOME | End: 2024-02-21
Payer: COMMERCIAL

## 2024-02-21 DIAGNOSIS — R79.89 LOW THYROID STIMULATING HORMONE (TSH) LEVEL: ICD-10-CM

## 2024-02-21 PROCEDURE — A9516 IODINE I-123 SOD IODIDE MIC: HCPCS | Performed by: NURSE PRACTITIONER

## 2024-02-21 PROCEDURE — 0 SODIUM IODIDE 3.7 MBQ CAPSULE: Performed by: NURSE PRACTITIONER

## 2024-02-21 PROCEDURE — 78014 THYROID IMAGING W/BLOOD FLOW: CPT

## 2024-02-21 RX ORDER — SODIUM IODIDE I 123 100 UCI/1
1 CAPSULE, GELATIN COATED ORAL
Status: COMPLETED | OUTPATIENT
Start: 2024-02-21 | End: 2024-02-21

## 2024-02-21 RX ADMIN — SODIUM IODIDE I 123 1 CAPSULE: 100 CAPSULE, GELATIN COATED ORAL at 14:08

## 2024-02-22 ENCOUNTER — HOSPITAL ENCOUNTER (OUTPATIENT)
Dept: NUCLEAR MEDICINE | Facility: HOSPITAL | Age: 44
Discharge: HOME | End: 2024-02-22
Payer: COMMERCIAL

## 2024-02-22 ENCOUNTER — HOSPITAL ENCOUNTER (OUTPATIENT)
Dept: ULTRASOUND IMAGING | Facility: HOSPITAL | Age: 44
Discharge: HOME | End: 2024-02-22
Payer: COMMERCIAL

## 2024-02-22 DIAGNOSIS — R79.89 LOW THYROID STIMULATING HORMONE (TSH) LEVEL: ICD-10-CM

## 2024-02-22 PROCEDURE — 76536 US EXAM OF HEAD AND NECK: CPT

## 2024-04-19 ENCOUNTER — TRANSCRIBE ORDERS (OUTPATIENT)
Dept: ADMINISTRATIVE | Facility: HOSPITAL | Age: 44
End: 2024-04-19
Payer: COMMERCIAL

## 2024-04-19 DIAGNOSIS — Z12.31 SCREENING MAMMOGRAM FOR BREAST CANCER: Primary | ICD-10-CM

## 2024-06-25 ENCOUNTER — HOSPITAL ENCOUNTER (OUTPATIENT)
Dept: MAMMOGRAPHY | Facility: HOSPITAL | Age: 44
Discharge: HOME | End: 2024-06-25
Admitting: OBSTETRICS & GYNECOLOGY
Payer: COMMERCIAL

## 2024-06-25 DIAGNOSIS — Z12.31 SCREENING MAMMOGRAM FOR BREAST CANCER: ICD-10-CM

## 2024-06-25 PROCEDURE — 77067 SCR MAMMO BI INCL CAD: CPT

## 2024-06-25 PROCEDURE — 77063 BREAST TOMOSYNTHESIS BI: CPT

## 2024-07-15 ENCOUNTER — TRANSCRIBE ORDERS (OUTPATIENT)
Dept: ADMINISTRATIVE | Facility: HOSPITAL | Age: 44
End: 2024-07-15
Payer: COMMERCIAL

## 2024-07-15 DIAGNOSIS — N63.10 MASS OF RIGHT BREAST, UNSPECIFIED QUADRANT: Primary | ICD-10-CM

## 2024-11-04 ENCOUNTER — APPOINTMENT (OUTPATIENT)
Dept: CT IMAGING | Facility: HOSPITAL | Age: 44
End: 2024-11-04
Payer: COMMERCIAL

## 2024-11-04 ENCOUNTER — HOSPITAL ENCOUNTER (EMERGENCY)
Facility: HOSPITAL | Age: 44
Discharge: HOME | End: 2024-11-04
Attending: EMERGENCY MEDICINE | Admitting: EMERGENCY MEDICINE
Payer: COMMERCIAL

## 2024-11-04 VITALS
SYSTOLIC BLOOD PRESSURE: 170 MMHG | HEART RATE: 70 BPM | OXYGEN SATURATION: 99 % | TEMPERATURE: 98 F | RESPIRATION RATE: 18 BRPM | BODY MASS INDEX: 43.4 KG/M2 | WEIGHT: 293 LBS | HEIGHT: 69 IN | DIASTOLIC BLOOD PRESSURE: 88 MMHG

## 2024-11-04 DIAGNOSIS — N39.0 URINARY TRACT INFECTION WITHOUT HEMATURIA, SITE UNSPECIFIED: Primary | ICD-10-CM

## 2024-11-04 DIAGNOSIS — D25.9 UTERINE LEIOMYOMA, UNSPECIFIED LOCATION: ICD-10-CM

## 2024-11-04 LAB
ALBUMIN SERPL-MCNC: 4.2 G/DL (ref 3.5–5.2)
ALBUMIN/GLOB SERPL: 1.2 G/DL
ALP SERPL-CCNC: 43 U/L (ref 39–117)
ALT SERPL W P-5'-P-CCNC: 10 U/L (ref 1–33)
ANION GAP SERPL CALCULATED.3IONS-SCNC: 12.8 MMOL/L (ref 5–15)
AST SERPL-CCNC: 14 U/L (ref 1–32)
BACTERIA UR QL AUTO: ABNORMAL /HPF
BASOPHILS # BLD AUTO: 0.02 10*3/MM3 (ref 0–0.2)
BASOPHILS NFR BLD AUTO: 0.3 % (ref 0–1.5)
BILIRUB SERPL-MCNC: 0.3 MG/DL (ref 0–1.2)
BILIRUB UR QL STRIP: NEGATIVE
BUN SERPL-MCNC: 17 MG/DL (ref 6–20)
BUN/CREAT SERPL: 17.2 (ref 7–25)
CALCIUM SPEC-SCNC: 9.4 MG/DL (ref 8.6–10.5)
CHLORIDE SERPL-SCNC: 101 MMOL/L (ref 98–107)
CLARITY UR: CLEAR
CO2 SERPL-SCNC: 25.2 MMOL/L (ref 22–29)
COLOR UR: YELLOW
CREAT SERPL-MCNC: 0.99 MG/DL (ref 0.57–1)
DEPRECATED RDW RBC AUTO: 51.4 FL (ref 37–54)
EGFRCR SERPLBLD CKD-EPI 2021: 71.8 ML/MIN/1.73
EOSINOPHIL # BLD AUTO: 0.02 10*3/MM3 (ref 0–0.4)
EOSINOPHIL NFR BLD AUTO: 0.3 % (ref 0.3–6.2)
ERYTHROCYTE [DISTWIDTH] IN BLOOD BY AUTOMATED COUNT: 16.4 % (ref 12.3–15.4)
GLOBULIN UR ELPH-MCNC: 3.6 GM/DL
GLUCOSE SERPL-MCNC: 85 MG/DL (ref 65–99)
GLUCOSE UR STRIP-MCNC: NEGATIVE MG/DL
HCG INTACT+B SERPL-ACNC: <0.5 MIU/ML
HCT VFR BLD AUTO: 35 % (ref 34–46.6)
HGB BLD-MCNC: 10.9 G/DL (ref 12–15.9)
HGB UR QL STRIP.AUTO: NEGATIVE
HOLD SPECIMEN: NORMAL
HOLD SPECIMEN: NORMAL
HYALINE CASTS UR QL AUTO: ABNORMAL /LPF
IMM GRANULOCYTES # BLD AUTO: 0.01 10*3/MM3 (ref 0–0.05)
IMM GRANULOCYTES NFR BLD AUTO: 0.1 % (ref 0–0.5)
KETONES UR QL STRIP: NEGATIVE
LEUKOCYTE ESTERASE UR QL STRIP.AUTO: NEGATIVE
LIPASE SERPL-CCNC: 15 U/L (ref 13–60)
LYMPHOCYTES # BLD AUTO: 2.44 10*3/MM3 (ref 0.7–3.1)
LYMPHOCYTES NFR BLD AUTO: 32.1 % (ref 19.6–45.3)
MCH RBC QN AUTO: 26.6 PG (ref 26.6–33)
MCHC RBC AUTO-ENTMCNC: 31.1 G/DL (ref 31.5–35.7)
MCV RBC AUTO: 85.4 FL (ref 79–97)
MONOCYTES # BLD AUTO: 0.63 10*3/MM3 (ref 0.1–0.9)
MONOCYTES NFR BLD AUTO: 8.3 % (ref 5–12)
NEUTROPHILS NFR BLD AUTO: 4.47 10*3/MM3 (ref 1.7–7)
NEUTROPHILS NFR BLD AUTO: 58.9 % (ref 42.7–76)
NITRITE UR QL STRIP: NEGATIVE
NRBC BLD AUTO-RTO: 0 /100 WBC (ref 0–0.2)
PH UR STRIP.AUTO: 6.5 [PH] (ref 5–8)
PLATELET # BLD AUTO: 276 10*3/MM3 (ref 140–450)
PMV BLD AUTO: 9.7 FL (ref 6–12)
POTASSIUM SERPL-SCNC: 3.3 MMOL/L (ref 3.5–5.2)
PROT SERPL-MCNC: 7.8 G/DL (ref 6–8.5)
PROT UR QL STRIP: ABNORMAL
RBC # BLD AUTO: 4.1 10*6/MM3 (ref 3.77–5.28)
RBC # UR STRIP: ABNORMAL /HPF
REF LAB TEST METHOD: ABNORMAL
SODIUM SERPL-SCNC: 139 MMOL/L (ref 136–145)
SP GR UR STRIP: 1.02 (ref 1–1.03)
SQUAMOUS #/AREA URNS HPF: ABNORMAL /HPF
UROBILINOGEN UR QL STRIP: ABNORMAL
WBC # UR STRIP: ABNORMAL /HPF
WBC NRBC COR # BLD AUTO: 7.59 10*3/MM3 (ref 3.4–10.8)
WHOLE BLOOD HOLD COAG: NORMAL
WHOLE BLOOD HOLD SPECIMEN: NORMAL

## 2024-11-04 PROCEDURE — 36415 COLL VENOUS BLD VENIPUNCTURE: CPT

## 2024-11-04 PROCEDURE — 99284 EMERGENCY DEPT VISIT MOD MDM: CPT

## 2024-11-04 PROCEDURE — 81001 URINALYSIS AUTO W/SCOPE: CPT

## 2024-11-04 PROCEDURE — 80053 COMPREHEN METABOLIC PANEL: CPT

## 2024-11-04 PROCEDURE — 74176 CT ABD & PELVIS W/O CONTRAST: CPT

## 2024-11-04 PROCEDURE — 83690 ASSAY OF LIPASE: CPT

## 2024-11-04 PROCEDURE — 84702 CHORIONIC GONADOTROPIN TEST: CPT

## 2024-11-04 PROCEDURE — 85025 COMPLETE CBC W/AUTO DIFF WBC: CPT

## 2024-11-04 RX ORDER — NITROFURANTOIN 25; 75 MG/1; MG/1
100 CAPSULE ORAL 2 TIMES DAILY
Qty: 14 CAPSULE | Refills: 0 | Status: SHIPPED | OUTPATIENT
Start: 2024-11-04

## 2024-11-04 RX ORDER — METHOCARBAMOL 750 MG/1
1500 TABLET, FILM COATED ORAL 3 TIMES DAILY PRN
Qty: 30 TABLET | Refills: 0 | Status: SHIPPED | OUTPATIENT
Start: 2024-11-04

## 2024-11-04 RX ORDER — MULTIVIT-MIN/IRON/FOLIC ACID/K 18-600-40
1 CAPSULE ORAL DAILY
COMMUNITY

## 2024-11-04 RX ORDER — SODIUM CHLORIDE 0.9 % (FLUSH) 0.9 %
10 SYRINGE (ML) INJECTION AS NEEDED
Status: DISCONTINUED | OUTPATIENT
Start: 2024-11-04 | End: 2024-11-04 | Stop reason: HOSPADM

## 2024-11-04 NOTE — ED PROVIDER NOTES
Time: 3:50 PM EST  Date of encounter:  2024  Independent Historian/Clinical History and Information was obtained by:   Patient    History is limited by: N/A    Chief Complaint: Flank pain      History of Present Illness:  Patient is a 44 y.o. year old female who presents to the emergency department for evaluation of right sided flank pain onset Friday.  Patient states it radiates up her back.  She thought it was just a pulled muscle so she has taken ibuprofen with no relief.  admits to nausea.  Denies history of kidney stones, urinary symptoms or abdominal pain.      Patient Care Team  Primary Care Provider: Rod Rain MD    Past Medical History:     Allergies   Allergen Reactions    Penicillins Swelling     Past Medical History:   Diagnosis Date    Anemia     Hypertension      Past Surgical History:   Procedure Laterality Date     SECTION      TUBAL ABDOMINAL LIGATION       Family History   Problem Relation Age of Onset    Hypertension Mother     Hypertension Maternal Grandmother     Heart disease Maternal Grandfather     Heart attack Neg Hx     Heart failure Neg Hx     Hyperlipidemia Neg Hx        Home Medications:  Prior to Admission medications    Medication Sig Start Date End Date Taking? Authorizing Provider   amLODIPine (NORVASC) 10 MG tablet amlodipine 10 mg oral tablet take 1 tablet (10 mg) by oral route once daily   Active    ProviderJazmyn MD   Cholecalciferol (Vitamin D3) 50 MCG ( UT) capsule Daily.    Jazmyn Sargent MD   docusate calcium (SURFAK) 240 MG capsule Daily.  Patient not taking: Reported on 3/22/2023    Jazymn Sargent MD   ferrous sulfate 325 (65 FE) MG tablet Take 1 tablet by mouth 3 (Three) Times a Day. 22   Jazmyn Sargent MD   hydrALAZINE (APRESOLINE) 25 MG tablet Take 2 tablets by mouth 3 (Three) Times a Day for 7 days. 3/22/23 3/29/23  CALI Calhoun MD   hydroCHLOROthiazide (HYDRODIURIL) 25 MG tablet Take 1 tablet by mouth Daily.  "3/22/23   CALI Calhoun MD   lisinopril (PRINIVIL,ZESTRIL) 40 MG tablet  2/27/23   Jazmyn Sargent MD   metoprolol succinate XL (TOPROL-XL) 50 MG 24 hr tablet Take 2 tablets by mouth Daily. with food 2/5/23   Jazmyn Sargent MD   potassium chloride (KLOR-CON) 8 MEQ CR tablet TAKE 1 TABLET BY MOUTH THREE TIMES A DAY FOR 90 DAYS 3/16/23   Jazmyn Sargent MD        Social History:   Social History     Tobacco Use    Smoking status: Never    Smokeless tobacco: Never   Vaping Use    Vaping status: Never Used   Substance Use Topics    Alcohol use: Never    Drug use: Never         Review of Systems:  Review of Systems   Constitutional:  Negative for chills and fever.   HENT:  Negative for congestion, rhinorrhea and sore throat.    Eyes:  Negative for pain and visual disturbance.   Respiratory:  Negative for apnea, cough, chest tightness and shortness of breath.    Cardiovascular:  Negative for chest pain and palpitations.   Gastrointestinal:  Positive for nausea. Negative for abdominal pain, diarrhea and vomiting.   Genitourinary:  Positive for flank pain. Negative for difficulty urinating, dysuria and hematuria.   Musculoskeletal:  Negative for joint swelling and myalgias.   Skin:  Negative for color change.   Neurological:  Negative for seizures and headaches.   Psychiatric/Behavioral: Negative.     All other systems reviewed and are negative.       Physical Exam:  /98 (BP Location: Right arm)   Pulse 75   Temp 98 °F (36.7 °C) (Oral)   Resp 16   Ht 175.3 cm (69\")   Wt (!) 150 kg (330 lb 11 oz)   LMP 10/09/2024 (Approximate)   SpO2 98%   BMI 48.83 kg/m²     Physical Exam  Vitals and nursing note reviewed.   Constitutional:       General: She is not in acute distress.     Appearance: Normal appearance. She is not toxic-appearing.   HENT:      Head: Normocephalic and atraumatic.      Jaw: There is normal jaw occlusion.      Mouth/Throat:      Mouth: Mucous membranes are moist.   Eyes:      " General: Lids are normal.      Extraocular Movements: Extraocular movements intact.      Conjunctiva/sclera: Conjunctivae normal.      Pupils: Pupils are equal, round, and reactive to light.   Cardiovascular:      Rate and Rhythm: Normal rate and regular rhythm.      Pulses: Normal pulses.      Heart sounds: Normal heart sounds.   Pulmonary:      Effort: Pulmonary effort is normal. No respiratory distress.      Breath sounds: Normal breath sounds. No wheezing or rhonchi.   Abdominal:      General: Abdomen is flat. There is no distension.      Palpations: Abdomen is soft.      Tenderness: There is no abdominal tenderness. There is no guarding or rebound.   Musculoskeletal:         General: Normal range of motion.      Cervical back: Normal range of motion and neck supple.      Right lower leg: No edema.      Left lower leg: No edema.      Comments: Tenderness right lumbar paraspinal muscles   Skin:     General: Skin is warm and dry.   Neurological:      General: No focal deficit present.      Mental Status: She is alert and oriented to person, place, and time. Mental status is at baseline.   Psychiatric:         Mood and Affect: Mood normal.         Behavior: Behavior normal.                  Procedures:  Procedures      Medical Decision Making:      Comorbidities that affect care:    Hypertension    External Notes reviewed:    None      The following orders were placed and all results were independently analyzed by me:  Orders Placed This Encounter   Procedures    CT Abdomen Pelvis Stone Protocol    [place] Draw    Comprehensive Metabolic Panel    Lipase    Urinalysis With Microscopic If Indicated (No Culture) - Urine, Clean Catch    hCG, Quantitative, Pregnancy    CBC Auto Differential    Urinalysis, Microscopic Only - Urine, Clean Catch    NPO Diet NPO Type: Strict NPO    Undress & Gown    Insert Peripheral IV    CBC & Differential    Green Top (Gel)    Lavender Top    Gold Top - SST    Light Blue Top        Medications Given in the Emergency Department:  Medications   sodium chloride 0.9 % flush 10 mL (has no administration in time range)        ED Course:    ED Course as of 11/04/24 1937   Mon Nov 04, 2024   1551 --- PROVIDER IN TRIAGE NOTE ---    The patient was evaluated by Lito espino in triage. Orders were placed and the patient is currently awaiting disposition.    [AJ]      ED Course User Index  [AJ] Lito Horowitz PA-C       Labs:    Lab Results (last 24 hours)       Procedure Component Value Units Date/Time    CBC & Differential [[id number]]  (Abnormal) Collected: 11/04/24 1610    Specimen: Blood from Arm, Right Updated: 11/04/24 1639    Narrative:      The following orders were created for panel order CBC & Differential.  Procedure                               Abnormality         Status                     ---------                               -----------         ------                     CBC Auto Differential[[id number]]        Abnormal            Final result                 Please view results for these tests on the individual orders.    Comprehensive Metabolic Panel [[id number]]  (Abnormal) Collected: 11/04/24 1610    Specimen: Blood from Arm, Right Updated: 11/04/24 1706     Glucose 85 mg/dL      BUN 17 mg/dL      Creatinine 0.99 mg/dL      Sodium 139 mmol/L      Potassium 3.3 mmol/L      Chloride 101 mmol/L      CO2 25.2 mmol/L      Calcium 9.4 mg/dL      Total Protein 7.8 g/dL      Albumin 4.2 g/dL      ALT (SGPT) 10 U/L      AST (SGOT) 14 U/L      Alkaline Phosphatase 43 U/L      Total Bilirubin 0.3 mg/dL      Globulin 3.6 gm/dL      A/G Ratio 1.2 g/dL      BUN/Creatinine Ratio 17.2     Anion Gap 12.8 mmol/L      eGFR 71.8 mL/min/1.73     Narrative:      GFR Normal >60  Chronic Kidney Disease <60  Kidney Failure <15      Lipase [[id number]]  (Normal) Collected: 11/04/24 1610    Specimen: Blood from Arm, Right Updated: 11/04/24 1706     Lipase 15 U/L     hCG, Quantitative,  Pregnancy [[id number]] Collected: 11/04/24 1610    Specimen: Blood from Arm, Right Updated: 11/04/24 1657     HCG Quantitative <0.50 mIU/mL     Narrative:      HCG Ranges by Gestational Age    Females - non-pregnant premenopausal   </= 1mIU/mL HCG  Females - postmenopausal               </= 7mIU/mL HCG    3 Weeks       5.4   -      72 mIU/mL  4 Weeks      10.2   -     708 mIU/mL  5 Weeks       217   -   8,245 mIU/mL  6 Weeks       152   -  32,177 mIU/mL  7 Weeks     4,059   - 153,767 mIU/mL  8 Weeks    31,366   - 149,094 mIU/mL  9 Weeks    59,109   - 135,901 mIU/mL  10 Weeks   44,186   - 170,409 mIU/mL  12 Weeks   27,107   - 201,615 mIU/mL  14 Weeks   24,302   -  93,646 mIU/mL  15 Weeks   12,540   -  69,747 mIU/mL  16 Weeks    8,904   -  55,332 mIU/mL  17 Weeks    8,240   -  51,793 mIU/mL  18 Weeks    9,649   -  55,271 mIU/mL      CBC Auto Differential [[id number]]  (Abnormal) Collected: 11/04/24 1610    Specimen: Blood from Arm, Right Updated: 11/04/24 1639     WBC 7.59 10*3/mm3      RBC 4.10 10*6/mm3      Hemoglobin 10.9 g/dL      Hematocrit 35.0 %      MCV 85.4 fL      MCH 26.6 pg      MCHC 31.1 g/dL      RDW 16.4 %      RDW-SD 51.4 fl      MPV 9.7 fL      Platelets 276 10*3/mm3      Neutrophil % 58.9 %      Lymphocyte % 32.1 %      Monocyte % 8.3 %      Eosinophil % 0.3 %      Basophil % 0.3 %      Immature Grans % 0.1 %      Neutrophils, Absolute 4.47 10*3/mm3      Lymphocytes, Absolute 2.44 10*3/mm3      Monocytes, Absolute 0.63 10*3/mm3      Eosinophils, Absolute 0.02 10*3/mm3      Basophils, Absolute 0.02 10*3/mm3      Immature Grans, Absolute 0.01 10*3/mm3      nRBC 0.0 /100 WBC     Urinalysis With Microscopic If Indicated (No Culture) - Urine, Clean Catch [[id number]]  (Abnormal) Collected: 11/04/24 1659    Specimen: Urine, Clean Catch Updated: 11/04/24 1723     Color, UA Yellow     Appearance, UA Clear     pH, UA 6.5     Specific Gravity, UA 1.024     Glucose, UA Negative     Ketones, UA Negative      Bilirubin, UA Negative     Blood, UA Negative     Protein, UA 30 mg/dL (1+)     Leuk Esterase, UA Negative     Nitrite, UA Negative     Urobilinogen, UA 1.0 E.U./dL    Urinalysis, Microscopic Only - Urine, Clean Catch [[id number]]  (Abnormal) Collected: 11/04/24 1659    Specimen: Urine, Clean Catch Updated: 11/04/24 1723     RBC, UA 0-2 /HPF      WBC, UA 3-5 /HPF      Bacteria, UA 1+ /HPF      Squamous Epithelial Cells, UA 3-6 /HPF      Hyaline Casts, UA 0-2 /LPF      Methodology Automated Microscopy             Imaging:    CT Abdomen Pelvis Stone Protocol    Result Date: 11/4/2024  CT ABDOMEN PELVIS STONE PROTOCOL Date of Exam: 11/4/2024 5:37 PM EST Indication: r flank pain. Comparison: None available. Technique: Axial CT images were obtained of the abdomen and pelvis without the administration of contrast. Reconstructed coronal and sagittal images were also obtained. Automated exposure control and iterative construction methods were used. FINDINGS: Abdomen/Pelvis: Lower Chest: Limited imaging lung bases is grossly clear. No free air is noted below the diaphragm. Organs: 2 cm low-attenuation lesion in the periphery of the right hepatic lobe represent a cyst or hemangioma. Additional small 5 mm low-attenuation lesion also likely a cyst or hemangioma noted. Limited noncontrast imaging of the liver is otherwise grossly unremarkable in appearance. Gallbladder is grossly unremarkable on motion limited imaging. The limited noncontrast imaging of the spleen, pancreas and adrenal glands appear unremarkable in appearance. No obvious calculi along the visualized or expected course of the ureters. GI/Bowel: The bowel is displaced by an enlarged uterus. Stomach and the small bowel demonstrate no evidence of obstruction. Colon demonstrates no definite acute abnormality on limited imaging. Small lymph nodes within the mesentery likely reactive Pelvis: The uterus is enlarged with complex solid lesions containing soft tissue fluid  density and calcification suggesting fibroids with some necrotic changes. The largest of these measures approximately 12.9 x 11.2 cm on the left. This displaces the bladder. Ovaries are not well visualized, able to be distinguished from the uterus. Small amount of fluid is noted within the pelvis, nonspecific. Peritoneum/Retroperitoneum: The aorta is normal in caliber on limited noncontrast imaging. Bones/Soft Tissues: No acute osseous abnormality. Multilevel degenerative change of the spine     1. Enlarged uterus with several prominent fibroids suspected. The ovaries were unable to be visualized. 2. No evidence of renal calculi or obstructive uropathy. 3. Other findings as above Electronically Signed: Fabrice Ramos MD  11/4/2024 5:58 PM EST  Workstation ID: [id number]       Differential Diagnosis and Discussion:    Abdominal Pain: Based on the patient's signs and symptoms, I considered abdominal aortic aneurysm, small bowel obstruction, pancreatitis, acute cholecystitis, acute appendecitis, peptic ulcer disease, gastritis, colitis, endocrine disorders, irritable bowel syndrome and other differential diagnosis an etiology of the patient's abdominal pain.  Back Pain: The patient presents with back pain. My differential diagnosis includes but is not limited to acute spinal epidural abscess, acute spinal epidural bleed, cauda equina syndrome, abdominal aortic aneurysm, aortic dissection, kidney stone, pyelonephritis, musculoskeletal back pain, spinal fracture, and osteoarthritis.     All labs were reviewed and interpreted by me.  CT scan radiology impression was interpreted by me.    MDM  Number of Diagnoses or Management Options  Urinary tract infection without hematuria, site unspecified  Uterine leiomyoma, unspecified location  Diagnosis management comments: In summary this is a 44-year-old female who presents to the emergency department for evaluation of right flank/back pain.  Unknown/no definite injury  identified.  CBC independently reviewed and interpreted by me and shows no critical abnormalities.  CMP independently reviewed and interpreted by me and shows no critical abnormalities.  Urinalysis independent viewed interpreted by me is positive for mild infection.  CT scan of the abdomen pelvis reviewed by me is unremarkable for acute pathology, specifically no ureterolithiasis or nephrolithiasis however does identify a very large uterine fibroids of which patient has been informed.  Very strict return to ER and follow-up instructions have been provided to the patient.  Prescription antibiotics provided       Amount and/or Complexity of Data Reviewed  Clinical lab tests: reviewed  Tests in the radiology section of CPT®: reviewed                       Patient Care Considerations:    CONSULT: I considered consulting urology, however no obstructive uropathy identified      Consultants/Shared Management Plan:    None    Social Determinants of Health:    Patient is independent, reliable, and has access to care.       Disposition and Care Coordination:    Discharged: I considered escalation of care by admitting this patient to the hospital, however no obstructive uropathy identified    I have explained the patient´s condition, diagnoses and treatment plan based on the information available to me at this time. I have answered questions and addressed any concerns. The patient has a good  understanding of the patient´s diagnosis, condition, and treatment plan as can be expected at this point. The vital signs have been stable. The patient´s condition is stable and appropriate for discharge from the emergency department.      The patient will pursue further outpatient evaluation with the primary care physician or other designated or consulting physician as outlined in the discharge instructions. They are agreeable to this plan of care and follow-up instructions have been explained in detail. The patient has received these  instructions in written format and has expressed an understanding of the discharge instructions. The patient is aware that any significant change in condition or worsening of symptoms should prompt an immediate return to this or the closest emergency department or call to 911.  I have explained discharge medications and the need for follow up with the patient/caretakers. This was also printed in the discharge instructions. Patient was discharged with the following medications and follow up:      Medication List        New Prescriptions      methocarbamol 750 MG tablet  Commonly known as: ROBAXIN  Take 2 tablets by mouth 3 (Three) Times a Day As Needed for Muscle Spasms.     nitrofurantoin (macrocrystal-monohydrate) 100 MG capsule  Commonly known as: MACROBID  Take 1 capsule by mouth 2 (Two) Times a Day.               Where to Get Your Medications        These medications were sent to [id number] - [place], [place] - [address] [facility] AT [facility] OF [address] & [place] - [phone] [facility] [phone]   [address] [facility] [address]      Phone: [phone]   methocarbamol 750 MG tablet  nitrofurantoin (macrocrystal-monohydrate) 100 MG capsule      Rod Rain MD  [address] [facility] [address]  [place] [address]  [phone]    In 1 week      Theodora Chen MD  [address] [facility] [address]  [place] [address]  [phone]    Call          Final diagnoses:   Urinary tract infection without hematuria, site unspecified   Uterine leiomyoma, unspecified location        ED Disposition       ED Disposition   Discharge    Condition   Stable    Comment   --               This medical record created using voice recognition software.             Mateo Price MD  11/04/24 1571

## 2024-11-13 ENCOUNTER — LAB (OUTPATIENT)
Dept: LAB | Facility: HOSPITAL | Age: 44
End: 2024-11-13
Payer: COMMERCIAL

## 2024-11-13 ENCOUNTER — TRANSCRIBE ORDERS (OUTPATIENT)
Dept: ADMINISTRATIVE | Facility: HOSPITAL | Age: 44
End: 2024-11-13
Payer: COMMERCIAL

## 2024-11-13 DIAGNOSIS — N92.1 MENOMETRORRHAGIA: Primary | ICD-10-CM

## 2024-11-13 DIAGNOSIS — N92.1 MENOMETRORRHAGIA: ICD-10-CM

## 2024-11-13 LAB
ALBUMIN SERPL-MCNC: 4.3 G/DL (ref 3.5–5.2)
ALBUMIN/GLOB SERPL: 1.3 G/DL
ALP SERPL-CCNC: 41 U/L (ref 39–117)
ALT SERPL W P-5'-P-CCNC: 12 U/L (ref 1–33)
ANION GAP SERPL CALCULATED.3IONS-SCNC: 14.1 MMOL/L (ref 5–15)
AST SERPL-CCNC: 21 U/L (ref 1–32)
BASOPHILS # BLD AUTO: 0.02 10*3/MM3 (ref 0–0.2)
BASOPHILS NFR BLD AUTO: 0.3 % (ref 0–1.5)
BILIRUB SERPL-MCNC: <0.2 MG/DL (ref 0–1.2)
BUN SERPL-MCNC: 13 MG/DL (ref 6–20)
BUN/CREAT SERPL: 17.3 (ref 7–25)
CALCIUM SPEC-SCNC: 9.9 MG/DL (ref 8.6–10.5)
CHLORIDE SERPL-SCNC: 100 MMOL/L (ref 98–107)
CO2 SERPL-SCNC: 26.9 MMOL/L (ref 22–29)
CREAT SERPL-MCNC: 0.75 MG/DL (ref 0.57–1)
DEPRECATED RDW RBC AUTO: 47 FL (ref 37–54)
EGFRCR SERPLBLD CKD-EPI 2021: 100.2 ML/MIN/1.73
EOSINOPHIL # BLD AUTO: 0.06 10*3/MM3 (ref 0–0.4)
EOSINOPHIL NFR BLD AUTO: 0.8 % (ref 0.3–6.2)
ERYTHROCYTE [DISTWIDTH] IN BLOOD BY AUTOMATED COUNT: 14.7 % (ref 12.3–15.4)
FSH SERPL-ACNC: 6.43 MIU/ML
GLOBULIN UR ELPH-MCNC: 3.3 GM/DL
GLUCOSE SERPL-MCNC: 77 MG/DL (ref 65–99)
HCG SERPL QL: NEGATIVE
HCT VFR BLD AUTO: 34.3 % (ref 34–46.6)
HGB BLD-MCNC: 10.3 G/DL (ref 12–15.9)
IMM GRANULOCYTES # BLD AUTO: 0.01 10*3/MM3 (ref 0–0.05)
IMM GRANULOCYTES NFR BLD AUTO: 0.1 % (ref 0–0.5)
LH SERPL-ACNC: 6.86 MIU/ML
LYMPHOCYTES # BLD AUTO: 2.34 10*3/MM3 (ref 0.7–3.1)
LYMPHOCYTES NFR BLD AUTO: 32.1 % (ref 19.6–45.3)
MCH RBC QN AUTO: 26.1 PG (ref 26.6–33)
MCHC RBC AUTO-ENTMCNC: 30 G/DL (ref 31.5–35.7)
MCV RBC AUTO: 87.1 FL (ref 79–97)
MONOCYTES # BLD AUTO: 0.64 10*3/MM3 (ref 0.1–0.9)
MONOCYTES NFR BLD AUTO: 8.8 % (ref 5–12)
NEUTROPHILS NFR BLD AUTO: 4.21 10*3/MM3 (ref 1.7–7)
NEUTROPHILS NFR BLD AUTO: 57.9 % (ref 42.7–76)
NRBC BLD AUTO-RTO: 0 /100 WBC (ref 0–0.2)
PLATELET # BLD AUTO: 295 10*3/MM3 (ref 140–450)
PMV BLD AUTO: 10.5 FL (ref 6–12)
POTASSIUM SERPL-SCNC: 3.3 MMOL/L (ref 3.5–5.2)
PROT SERPL-MCNC: 7.6 G/DL (ref 6–8.5)
RBC # BLD AUTO: 3.94 10*6/MM3 (ref 3.77–5.28)
SODIUM SERPL-SCNC: 141 MMOL/L (ref 136–145)
T4 SERPL-MCNC: 8.95 MCG/DL (ref 4.5–11.7)
TSH SERPL DL<=0.05 MIU/L-ACNC: 0.33 UIU/ML (ref 0.27–4.2)
WBC NRBC COR # BLD AUTO: 7.28 10*3/MM3 (ref 3.4–10.8)

## 2024-11-13 PROCEDURE — 80050 GENERAL HEALTH PANEL: CPT

## 2024-11-13 PROCEDURE — 84703 CHORIONIC GONADOTROPIN ASSAY: CPT

## 2024-11-13 PROCEDURE — 36415 COLL VENOUS BLD VENIPUNCTURE: CPT

## 2024-11-13 PROCEDURE — 84436 ASSAY OF TOTAL THYROXINE: CPT

## 2024-11-13 PROCEDURE — 83002 ASSAY OF GONADOTROPIN (LH): CPT

## 2024-11-13 PROCEDURE — 83001 ASSAY OF GONADOTROPIN (FSH): CPT

## 2024-12-03 ENCOUNTER — PREP FOR SURGERY (OUTPATIENT)
Dept: OTHER | Facility: HOSPITAL | Age: 44
End: 2024-12-03
Payer: COMMERCIAL

## 2024-12-03 DIAGNOSIS — N92.1 MENOMETRORRHAGIA: Primary | ICD-10-CM

## 2025-01-13 ENCOUNTER — TELEPHONE (OUTPATIENT)
Dept: SURGERY | Facility: CLINIC | Age: 45
End: 2025-01-13

## 2025-04-29 NOTE — PROGRESS NOTES
Chief Complaint/Reason for Referral:  Establish Care and Anemia    Radha Ortiz APRN Ahmed, Ayaz, MD    Records Obtained:  Records of the patients history including those obtained from [facility] EMR were reviewed and summarized in detail.    Subjective    History of Present Illness    Kaity Merritt 45 y.o. presents to [facility] HEMATOLOGY [facility] ONCOLOGY for Iron Deficiency Anemia    History of Present Illness  The patient is a 45-year-old female who presents to the hematology oncology department as a new patient for further evaluation of her iron deficiency anemia.    Anemia has been experienced for several years, with a diagnosis confirmed a few years ago. Iron levels have consistently been low. A blood transfusion was received during her first pregnancy 27 years ago, but she has never undergone an iron infusion. A genetic predisposition is suspected, as two of her sisters require iron infusions. Recent lab work conducted by her nurse practitioner revealed low iron levels, prompting her referral to this department. She adheres to a regimen of iron supplements taken two-three times daily with vitamin C, as taking them without food induces nausea. Vomiting is not reported, but stomach discomfort occurs if she does not eat. Cravings for ice and rice are reported. Fatigue is experienced most of the time, but no pain is reported. There are no significant nosebleeds, rectal bleeding, or blood in her urine. She is open to the possibility of iron infusions if they would improve her condition.    A history of heavy menstrual cycles is noted, and uterine fibroids were diagnosed during an ER visit. She is currently seeking a new gynecologist due to the senior living of her previous one in 12/2024.    She is under the care of an endocrinologist for her thyroid condition, which is being monitored. A family history of low thyroid levels is present, with two sisters having thyroid cancer.    Potassium supplements are  being taken.    FAMILY HISTORY  She has 2 sisters who take iron infusions, suggesting a possible genetic component to her anemia. She also has 2 sisters with thyroid issues, including thyroid cancer.     Cancer-related family history is not on file.     Oncology/Hematology History    No history exists.     Review of Systems    Current Outpatient Medications on File Prior to Visit   Medication Sig Dispense Refill    amLODIPine (NORVASC) 10 MG tablet amlodipine 10 mg oral tablet take 1 tablet (10 mg) by oral route once daily   Active      Ascorbic Acid (Vitamin C) 500 MG capsule Take 1 tablet by mouth Daily.      Cholecalciferol (Vitamin D3) 50 MCG (2000 UT) capsule Daily.      famotidine (PEPCID) 20 MG tablet Take 1 tablet by mouth Every 12 (Twelve) Hours.      ferrous sulfate 325 (65 FE) MG tablet Take 1 tablet by mouth 3 (Three) Times a Day.      hydroCHLOROthiazide (HYDRODIURIL) 25 MG tablet Take 1 tablet by mouth Daily. 90 tablet 3    lisinopril (PRINIVIL,ZESTRIL) 40 MG tablet       methocarbamol (ROBAXIN) 750 MG tablet Take 2 tablets by mouth 3 (Three) Times a Day As Needed for Muscle Spasms. 30 tablet 0    metoprolol succinate XL (TOPROL-XL) 50 MG 24 hr tablet Take 2 tablets by mouth Daily. with food      potassium chloride (KLOR-CON M20) 20 MEQ CR tablet       potassium chloride (KLOR-CON) 8 MEQ CR tablet TAKE 1 TABLET BY MOUTH THREE TIMES A DAY FOR 90 DAYS      hydrALAZINE (APRESOLINE) 25 MG tablet Take 2 tablets by mouth 3 (Three) Times a Day for 7 days. 270 tablet 3    nitrofurantoin, macrocrystal-monohydrate, (MACROBID) 100 MG capsule Take 1 capsule by mouth 2 (Two) Times a Day. (Patient not taking: Reported on 2025) 14 capsule 0     No current facility-administered medications on file prior to visit.       Allergies   Allergen Reactions    Penicillins Swelling     Past Medical History:   Diagnosis Date    Anemia     Hypertension      Past Surgical History:   Procedure Laterality Date      "SECTION      TUBAL ABDOMINAL LIGATION       Social History     Socioeconomic History    Marital status:    Tobacco Use    Smoking status: Never    Smokeless tobacco: Never   Vaping Use    Vaping status: Never Used   Substance and Sexual Activity    Alcohol use: Never    Drug use: Never    Sexual activity: Defer     Family History   Problem Relation Age of Onset    Hypertension Mother     Hypertension Maternal Grandmother     Heart disease Maternal Grandfather     Heart attack Neg Hx     Heart failure Neg Hx     Hyperlipidemia Neg Hx        There is no immunization history on file for this patient.    Tobacco Use: Low Risk  (4/30/2025)    Patient History     Smoking Tobacco Use: Never     Smokeless Tobacco Use: Never     Passive Exposure: Not on file     Objective     Vitals:    04/30/25 0843   BP: 174/84   Pulse: 73   Resp: 20   TempSrc: Temporal   SpO2: 97%   Weight: (!) 155 kg (340 lb 12.8 oz)   Height: 175.3 cm (69\")   PainSc: 0-No pain      Physical Exam  Vitals and nursing note reviewed.   Constitutional:       General: She is not in acute distress.     Appearance: Normal appearance. She is not ill-appearing.   HENT:      Head: Normocephalic.   Eyes:      Conjunctiva/sclera: Conjunctivae normal.   Cardiovascular:      Rate and Rhythm: Normal rate and regular rhythm.      Heart sounds: Normal heart sounds.   Pulmonary:      Effort: Pulmonary effort is normal.      Breath sounds: Normal breath sounds.   Skin:     Coloration: Skin is not jaundiced.   Neurological:      Mental Status: She is alert.   Psychiatric:         Mood and Affect: Mood normal.         Behavior: Behavior normal. Behavior is cooperative.         Thought Content: Thought content normal.         Judgment: Judgment normal.       Wt Readings from Last 3 Encounters:   04/30/25 (!) 155 kg (340 lb 12.8 oz)   11/04/24 (!) 150 kg (330 lb 11 oz)   03/22/23 (!) 156 kg (343 lb 3.2 oz)      Body mass index is 50.33 kg/m².    ECOG score: 0     "     ECOG: (0) Fully Active - Able to Carry On All Pre-disease Performance Without Restriction  Fall Risk Assessment was completed, and patient is at low risk for falls.  PHQ-9 Total Score:         The patient is  experiencing fatigue. Fatigue score: 9    PT/OT Functional Screening:   Speech Functional Screening:   Rehab to be ordered:     Vitals:    04/30/25 0843   PainSc: 0-No pain           Kaity Merritt reports a pain score of 0.       PHQ-2 Depression Screening  Little interest or pleasure in doing things? Not at all   Feeling down, depressed, or hopeless? Not at all   PHQ-2 Total Score 0     Result Review :  The following data was reviewed by: Renato Hagen PA-C on 04/30/2025:    RED BLOOD CELLs:  Lab Results   Component Value Date    RBC 3.94 11/13/2024    HGB 10.3 (L) 11/13/2024    HCT 34.3 11/13/2024    MCV 87.1 11/13/2024     11/13/2024      WHITE BLOOD CELLs:  Lab Results   Component Value Date    WBC 7.28 11/13/2024    NEUTROABS 4.21 11/13/2024    LYMPHSABS 2.34 11/13/2024    EOSABS 0.06 11/13/2024    BASOSABS 0.02 11/13/2024     RBC EVALUATION (MICROCYTOSIS/NORMOCYTOSIS):  Lab Results   Component Value Date    MCV 87.1 11/13/2024     HEMOLYSIS EVALUATION:  Lab Results   Component Value Date    MCV 87.1 11/13/2024     MACROCYTOSIS EVALUATION:  Lab Results   Component Value Date    MCV 87.1 11/13/2024     RENAL FUNCTION TESTs:  Lab Results   Component Value Date    EGFR 100.2 11/13/2024    BUN 13 11/13/2024     LIVER FUNCTION TESTs:  Lab Results   Component Value Date    ALT 12 11/13/2024    AST 21 11/13/2024    BILITOT <0.2 11/13/2024    ALKPHOS 41 11/13/2024    PROTEINTOT 7.6 11/13/2024    ALBUMIN 4.3 11/13/2024     GLUCOSE EVALUATION:  Lab Results   Component Value Date    GLUCOSE 77 11/13/2024     SERUM CHEMISTRIES:  Lab Results   Component Value Date     11/13/2024    K 3.3 (L) 11/13/2024     11/13/2024    CO2 26.9 11/13/2024    CALCIUM 9.9 11/13/2024     URINALYSIS:  Lab Results    Component Value Date    LEUKOCYTESUR Negative 11/04/2024    BILIRUBINUR Negative 11/04/2024    PHUR 6.5 11/04/2024    SPECGRAVUR 1.024 11/04/2024     THYROID FUNCTION TESTs:  TSH   Date Value Ref Range Status   11/13/2024 0.329 0.270 - 4.200 uIU/mL Final     Free T4   Date Value Ref Range Status   06/25/2024 1.09 0.7 - 1.48 ng/dL Final     No Images in the past 120 days found..  CT Abdomen Pelvis Stone Protocol (11/04/2024 17:38)   Results  Labs   - CBC: 03/18/2025, White blood cell count: 7.7 (normal), Hemoglobin: 9.8 (low), Platelet count: 221 (normal), MCV: 78   - Blood glucose: 03/18/2025, 80 (normal)   - Potassium: 03/18/2025, 3.0 (low)   - BUN and creatinine: 03/18/2025, Normal   - Calcium: 03/18/2025, 9.6 (normal)   - AST: 03/18/2025, 91 (high)   - TSH: 03/18/2025, Low       Assessment and Plan:  Diagnoses and all orders for this visit:    1. Normocytic anemia (Primary)  -     H. Pylori Antigen, Stool - Stool, Per Rectum  -     Vitamin B12 & Folate  -     Comprehensive Metabolic Panel  -     Urinalysis With Microscopic - Urine, Clean Catch  -     Occult Blood, Fecal By Immunoassay - Stool, Per Rectum  -     Reticulocytes  -     Lactate Dehydrogenase  -     Haptoglobin  -     Iron Profile  -     Ferritin  -     CBC & Differential  -     Peripheral Blood Smear  -     Sedimentation Rate  -     C-reactive Protein  -     Cancel: US Abdomen Limited; Future    2. Adverse effect of iron, sequela  Comments:  GI Side-effects  Orders:  -     ONCBCN INFUSION APPOINTMENT REQUEST 01; Future  -     ONCBCN INFUSION APPOINTMENT REQUEST 01; Future  -     ONCBCN INFUSION APPOINTMENT REQUEST 01; Future    3. Iron deficiency anemia, unspecified iron deficiency anemia type  -     ferrous gluconate (FERGON) 324 MG tablet; Take 1 tablet by mouth Every Other Day.  Dispense: 90 tablet; Refill: 1  -     ONCBCN INFUSION APPOINTMENT REQUEST 01; Future  -     ONCBCN INFUSION APPOINTMENT REQUEST 01; Future  -     ONCBCN INFUSION  APPOINTMENT REQUEST 01; Future    4. Elevated LFTs    5. Menorrhagia with irregular cycle  Comments:  Uterine Fibroids - GYN  Orders:  -     ONCBCN INFUSION APPOINTMENT REQUEST 01; Future  -     ONCBCN INFUSION APPOINTMENT REQUEST 01; Future  -     ONCBCN INFUSION APPOINTMENT REQUEST 01; Future    6. Menorrhagia with irregular cycle  -     ONCBCN INFUSION APPOINTMENT REQUEST 01; Future  -     ONCBCN INFUSION APPOINTMENT REQUEST 01; Future  -     ONCBCN INFUSION APPOINTMENT REQUEST 01; Future    7. Adverse effect of iron, sequela  -     ONCBCN INFUSION APPOINTMENT REQUEST 01; Future  -     ONCBCN INFUSION APPOINTMENT REQUEST 01; Future  -     ONCBCN INFUSION APPOINTMENT REQUEST 01; Future    8. Class 3 severe obesity due to excess calories with serious comorbidity and body mass index (BMI) of 50.0 to 59.9 in adult  Comments:  Diet and exercise as tolerated    Other orders  -     sodium chloride 0.9 % infusion  -     iron sucrose (VENOFER) 300 mg in sodium chloride 0.9 % 250 mL IVPB  -     Hydrocortisone Sod Suc (PF) (Solu-CORTEF) injection 100 mg  -     diphenhydrAMINE (BENADRYL) injection 50 mg  -     famotidine (PEPCID) injection 20 mg  -     sodium chloride 0.9 % infusion  -     iron sucrose (VENOFER) 300 mg in sodium chloride 0.9 % 250 mL IVPB  -     Hydrocortisone Sod Suc (PF) (Solu-CORTEF) injection 100 mg  -     diphenhydrAMINE (BENADRYL) injection 50 mg  -     famotidine (PEPCID) injection 20 mg  -     sodium chloride 0.9 % infusion  -     iron sucrose (VENOFER) 300 mg in sodium chloride 0.9 % 250 mL IVPB  -     Hydrocortisone Sod Suc (PF) (Solu-CORTEF) injection 100 mg  -     diphenhydrAMINE (BENADRYL) injection 50 mg  -     famotidine (PEPCID) injection 20 mg      Assessment & Plan  Iron deficiency anemia:  - Hemoglobin level significantly low at 9.8 g/dL  - Potential causes: genetic factors and heavy menstrual cycles due to uterine fibroids  - Order for iron infusions placed, pending health insurance  approval  - Ferrous gluconate recommended orally every other day with food and orange juice to enhance absorption and reduce gastrointestinal side effects  - Explained rationale behind treatment plan, including role of hepcidin in blocking iron absorption when taken multiple times a day    Hypothyroidism:  - TSH levels are low  - Patient under care of endocrinologist for monitoring  - No medication prescribed yet as thyroid levels are not low enough to warrant treatment  - Continued follow-up with endocrinologist advised    Uterine fibroids:  - Heavy menstrual cycles present  - Diagnosed with uterine fibroids  - Consultation with new gynecologist advised (previous one retired)  - Managing fibroids may help alleviate heavy menstrual cycles and improve anemia    Low potassium:  - Potassium level slightly low at 3.0 mmol/L  - Patient currently taking potassium supplements    -Encouraged patient to remain up to date on all recommended preventative medicine services specific for their sex and age.  Some examples include:  Diabetes screening, Hypertensive screening, Immunizations, Lipid screenings (cholesterol), Depression screenings, Colorectal cancer screening, HIV screening, Cervical cancer screening, Breast cancer screening, Osteoporosis screening, Alcohol screening, Dental screening, Tobacco Use screening and Dietary screening    Patient Follow Up: 8 weeks with PA/APRN    I spent 45 minutes caring for Kaity on this date of service. This time includes time spent by me in the following activities:preparing for the visit, reviewing tests, obtaining and/or reviewing a separately obtained history, performing a medically appropriate examination and/or evaluation , counseling and educating the patient/family/caregiver, ordering medications, tests, or procedures, documenting information in the medical record, independently interpreting results and communicating that information with the patient/family/caregiver, and care  coordination    Patient was given instructions and counseling regarding her condition or for health maintenance advice. Please see specific information pulled into the AVS if appropriate.     Patient or patient representative verbalized consent for the use of Ambient Listening during the visit with  Renato Hagen PA-C for chart documentation. 4/30/2025  09:00 EDT

## 2025-04-30 ENCOUNTER — CONSULT (OUTPATIENT)
Dept: ONCOLOGY | Facility: HOSPITAL | Age: 45
End: 2025-04-30
Payer: COMMERCIAL

## 2025-04-30 ENCOUNTER — LAB (OUTPATIENT)
Dept: ONCOLOGY | Facility: HOSPITAL | Age: 45
End: 2025-04-30
Payer: COMMERCIAL

## 2025-04-30 VITALS
WEIGHT: 293 LBS | RESPIRATION RATE: 20 BRPM | BODY MASS INDEX: 43.4 KG/M2 | OXYGEN SATURATION: 97 % | SYSTOLIC BLOOD PRESSURE: 174 MMHG | HEIGHT: 69 IN | DIASTOLIC BLOOD PRESSURE: 84 MMHG | HEART RATE: 73 BPM

## 2025-04-30 DIAGNOSIS — D50.9 IRON DEFICIENCY ANEMIA, UNSPECIFIED IRON DEFICIENCY ANEMIA TYPE: ICD-10-CM

## 2025-04-30 DIAGNOSIS — N92.1 MENORRHAGIA WITH IRREGULAR CYCLE: ICD-10-CM

## 2025-04-30 DIAGNOSIS — D64.9 NORMOCYTIC ANEMIA: Primary | ICD-10-CM

## 2025-04-30 DIAGNOSIS — E66.813 CLASS 3 SEVERE OBESITY DUE TO EXCESS CALORIES WITH SERIOUS COMORBIDITY AND BODY MASS INDEX (BMI) OF 50.0 TO 59.9 IN ADULT: ICD-10-CM

## 2025-04-30 DIAGNOSIS — E66.01 CLASS 3 SEVERE OBESITY DUE TO EXCESS CALORIES WITH SERIOUS COMORBIDITY AND BODY MASS INDEX (BMI) OF 50.0 TO 59.9 IN ADULT: ICD-10-CM

## 2025-04-30 DIAGNOSIS — D50.9 IRON DEFICIENCY ANEMIA, UNSPECIFIED IRON DEFICIENCY ANEMIA TYPE: Primary | ICD-10-CM

## 2025-04-30 DIAGNOSIS — T45.4X5S ADVERSE EFFECT OF IRON, SEQUELA: ICD-10-CM

## 2025-04-30 DIAGNOSIS — R79.89 ELEVATED LFTS: ICD-10-CM

## 2025-04-30 PROBLEM — T45.4X5A IRON ADVERSE REACTION: Status: ACTIVE | Noted: 2025-04-30

## 2025-04-30 LAB
ALBUMIN SERPL-MCNC: 4.2 G/DL (ref 3.5–5.2)
ALBUMIN/GLOB SERPL: 1.2 G/DL
ALP SERPL-CCNC: 45 U/L (ref 39–117)
ALT SERPL W P-5'-P-CCNC: 9 U/L (ref 1–33)
ANION GAP SERPL CALCULATED.3IONS-SCNC: 10.3 MMOL/L (ref 5–15)
AST SERPL-CCNC: 13 U/L (ref 1–32)
BILIRUB SERPL-MCNC: 0.3 MG/DL (ref 0–1.2)
BUN SERPL-MCNC: 14 MG/DL (ref 6–20)
BUN/CREAT SERPL: 16.9 (ref 7–25)
CALCIUM SPEC-SCNC: 9.7 MG/DL (ref 8.6–10.5)
CHLORIDE SERPL-SCNC: 101 MMOL/L (ref 98–107)
CO2 SERPL-SCNC: 26.7 MMOL/L (ref 22–29)
CREAT SERPL-MCNC: 0.83 MG/DL (ref 0.57–1)
CRP SERPL-MCNC: 0.37 MG/DL (ref 0–0.5)
CYTO UR: NORMAL
DEPRECATED RDW RBC AUTO: 49.6 FL (ref 37–54)
EGFRCR SERPLBLD CKD-EPI 2021: 88.2 ML/MIN/1.73
EOSINOPHIL # BLD MANUAL: 0.07 10*3/MM3 (ref 0–0.4)
EOSINOPHIL NFR BLD MANUAL: 1 % (ref 0.3–6.2)
ERYTHROCYTE [DISTWIDTH] IN BLOOD BY AUTOMATED COUNT: 15.8 % (ref 12.3–15.4)
ERYTHROCYTE [SEDIMENTATION RATE] IN BLOOD: 64 MM/HR (ref 0–20)
FERRITIN SERPL-MCNC: 37.35 NG/ML (ref 13–150)
FOLATE SERPL-MCNC: 17.4 NG/ML (ref 4.78–24.2)
GLOBULIN UR ELPH-MCNC: 3.6 GM/DL
GLUCOSE SERPL-MCNC: 90 MG/DL (ref 65–99)
HAPTOGLOB SERPL-MCNC: 196 MG/DL (ref 30–200)
HCT VFR BLD AUTO: 35.4 % (ref 34–46.6)
HGB BLD-MCNC: 11.2 G/DL (ref 12–15.9)
IRON 24H UR-MRATE: 34 MCG/DL (ref 37–145)
IRON SATN MFR SERPL: 8 % (ref 20–50)
LAB AP CASE REPORT: NORMAL
LAB AP CLINICAL INFORMATION: NORMAL
LDH SERPL-CCNC: 193 U/L (ref 135–214)
LYMPHOCYTES # BLD MANUAL: 1.91 10*3/MM3 (ref 0.7–3.1)
LYMPHOCYTES NFR BLD MANUAL: 10 % (ref 5–12)
MCH RBC QN AUTO: 27.4 PG (ref 26.6–33)
MCHC RBC AUTO-ENTMCNC: 31.6 G/DL (ref 31.5–35.7)
MCV RBC AUTO: 86.6 FL (ref 79–97)
MONOCYTES # BLD: 0.66 10*3/MM3 (ref 0.1–0.9)
NEUTROPHILS # BLD AUTO: 3.95 10*3/MM3 (ref 1.7–7)
NEUTROPHILS NFR BLD MANUAL: 58 % (ref 42.7–76)
NEUTS BAND NFR BLD MANUAL: 2 % (ref 0–5)
PATH REPORT.FINAL DX SPEC: NORMAL
PATH REPORT.GROSS SPEC: NORMAL
PATHOLOGY REVIEW: YES
PLATELET # BLD AUTO: 283 10*3/MM3 (ref 140–450)
PMV BLD AUTO: 9.3 FL (ref 6–12)
POTASSIUM SERPL-SCNC: 3.5 MMOL/L (ref 3.5–5.2)
PROT SERPL-MCNC: 7.8 G/DL (ref 6–8.5)
RBC # BLD AUTO: 4.09 10*6/MM3 (ref 3.77–5.28)
RBC MORPH BLD: NORMAL
RETICS # AUTO: 0.08 10*6/MM3 (ref 0.02–0.13)
RETICS/RBC NFR AUTO: 1.96 % (ref 0.7–1.9)
SMALL PLATELETS BLD QL SMEAR: ADEQUATE
SODIUM SERPL-SCNC: 138 MMOL/L (ref 136–145)
TIBC SERPL-MCNC: 425 MCG/DL (ref 298–536)
TRANSFERRIN SERPL-MCNC: 285 MG/DL (ref 200–360)
VARIANT LYMPHS NFR BLD MANUAL: 26 % (ref 19.6–45.3)
VARIANT LYMPHS NFR BLD MANUAL: 3 % (ref 0–5)
VIT B12 BLD-MCNC: >2000 PG/ML (ref 211–946)
WBC MORPH BLD: NORMAL
WBC NRBC COR # BLD AUTO: 6.59 10*3/MM3 (ref 3.4–10.8)

## 2025-04-30 PROCEDURE — 83615 LACTATE (LD) (LDH) ENZYME: CPT | Performed by: PHYSICIAN ASSISTANT

## 2025-04-30 PROCEDURE — 82607 VITAMIN B-12: CPT | Performed by: PHYSICIAN ASSISTANT

## 2025-04-30 PROCEDURE — 86140 C-REACTIVE PROTEIN: CPT | Performed by: PHYSICIAN ASSISTANT

## 2025-04-30 PROCEDURE — 85045 AUTOMATED RETICULOCYTE COUNT: CPT | Performed by: PHYSICIAN ASSISTANT

## 2025-04-30 PROCEDURE — 82746 ASSAY OF FOLIC ACID SERUM: CPT | Performed by: PHYSICIAN ASSISTANT

## 2025-04-30 PROCEDURE — 85007 BL SMEAR W/DIFF WBC COUNT: CPT | Performed by: PHYSICIAN ASSISTANT

## 2025-04-30 PROCEDURE — 85025 COMPLETE CBC W/AUTO DIFF WBC: CPT | Performed by: PHYSICIAN ASSISTANT

## 2025-04-30 PROCEDURE — 85652 RBC SED RATE AUTOMATED: CPT | Performed by: PHYSICIAN ASSISTANT

## 2025-04-30 PROCEDURE — 84466 ASSAY OF TRANSFERRIN: CPT | Performed by: PHYSICIAN ASSISTANT

## 2025-04-30 PROCEDURE — 36415 COLL VENOUS BLD VENIPUNCTURE: CPT | Performed by: PHYSICIAN ASSISTANT

## 2025-04-30 PROCEDURE — G0463 HOSPITAL OUTPT CLINIC VISIT: HCPCS | Performed by: PHYSICIAN ASSISTANT

## 2025-04-30 PROCEDURE — 80053 COMPREHEN METABOLIC PANEL: CPT | Performed by: PHYSICIAN ASSISTANT

## 2025-04-30 PROCEDURE — 83010 ASSAY OF HAPTOGLOBIN QUANT: CPT | Performed by: PHYSICIAN ASSISTANT

## 2025-04-30 PROCEDURE — 83540 ASSAY OF IRON: CPT | Performed by: PHYSICIAN ASSISTANT

## 2025-04-30 PROCEDURE — 82728 ASSAY OF FERRITIN: CPT | Performed by: PHYSICIAN ASSISTANT

## 2025-04-30 RX ORDER — SODIUM CHLORIDE 9 MG/ML
20 INJECTION, SOLUTION INTRAVENOUS ONCE
OUTPATIENT
Start: 2025-05-06

## 2025-04-30 RX ORDER — FAMOTIDINE 10 MG/ML
20 INJECTION, SOLUTION INTRAVENOUS AS NEEDED
OUTPATIENT
Start: 2025-05-07

## 2025-04-30 RX ORDER — FAMOTIDINE 20 MG/1
1 TABLET, FILM COATED ORAL EVERY 12 HOURS SCHEDULED
COMMUNITY
Start: 2025-03-18

## 2025-04-30 RX ORDER — FAMOTIDINE 10 MG/ML
20 INJECTION, SOLUTION INTRAVENOUS AS NEEDED
OUTPATIENT
Start: 2025-05-05

## 2025-04-30 RX ORDER — HYDROCORTISONE SODIUM SUCCINATE 100 MG/2ML
100 INJECTION INTRAMUSCULAR; INTRAVENOUS AS NEEDED
OUTPATIENT
Start: 2025-05-07

## 2025-04-30 RX ORDER — HYDROCORTISONE SODIUM SUCCINATE 100 MG/2ML
100 INJECTION INTRAMUSCULAR; INTRAVENOUS AS NEEDED
OUTPATIENT
Start: 2025-05-06

## 2025-04-30 RX ORDER — FERROUS GLUCONATE 324(38)MG
324 TABLET ORAL EVERY OTHER DAY
Qty: 90 TABLET | Refills: 1 | Status: SHIPPED | OUTPATIENT
Start: 2025-04-30

## 2025-04-30 RX ORDER — SODIUM CHLORIDE 9 MG/ML
20 INJECTION, SOLUTION INTRAVENOUS ONCE
OUTPATIENT
Start: 2025-05-05

## 2025-04-30 RX ORDER — HYDROCORTISONE SODIUM SUCCINATE 100 MG/2ML
100 INJECTION INTRAMUSCULAR; INTRAVENOUS AS NEEDED
OUTPATIENT
Start: 2025-05-05

## 2025-04-30 RX ORDER — DIPHENHYDRAMINE HYDROCHLORIDE 50 MG/ML
50 INJECTION, SOLUTION INTRAMUSCULAR; INTRAVENOUS AS NEEDED
OUTPATIENT
Start: 2025-05-07

## 2025-04-30 RX ORDER — DIPHENHYDRAMINE HYDROCHLORIDE 50 MG/ML
50 INJECTION, SOLUTION INTRAMUSCULAR; INTRAVENOUS AS NEEDED
OUTPATIENT
Start: 2025-05-06

## 2025-04-30 RX ORDER — POTASSIUM CHLORIDE 1500 MG/1
TABLET, EXTENDED RELEASE ORAL
COMMUNITY
Start: 2025-04-29

## 2025-04-30 RX ORDER — SODIUM CHLORIDE 9 MG/ML
20 INJECTION, SOLUTION INTRAVENOUS ONCE
OUTPATIENT
Start: 2025-05-07

## 2025-04-30 RX ORDER — FAMOTIDINE 10 MG/ML
20 INJECTION, SOLUTION INTRAVENOUS AS NEEDED
OUTPATIENT
Start: 2025-05-06

## 2025-04-30 RX ORDER — DIPHENHYDRAMINE HYDROCHLORIDE 50 MG/ML
50 INJECTION, SOLUTION INTRAMUSCULAR; INTRAVENOUS AS NEEDED
OUTPATIENT
Start: 2025-05-05

## 2025-05-02 DIAGNOSIS — D50.9 IRON DEFICIENCY ANEMIA, UNSPECIFIED IRON DEFICIENCY ANEMIA TYPE: Primary | ICD-10-CM

## 2025-05-19 ENCOUNTER — HOSPITAL ENCOUNTER (OUTPATIENT)
Dept: INFUSION THERAPY | Facility: HOSPITAL | Age: 45
Discharge: HOME | End: 2025-05-19
Admitting: PHYSICIAN ASSISTANT
Payer: COMMERCIAL

## 2025-05-19 VITALS
RESPIRATION RATE: 18 BRPM | TEMPERATURE: 97.9 F | BODY MASS INDEX: 43.4 KG/M2 | HEART RATE: 60 BPM | SYSTOLIC BLOOD PRESSURE: 152 MMHG | HEIGHT: 69 IN | OXYGEN SATURATION: 99 % | DIASTOLIC BLOOD PRESSURE: 93 MMHG | WEIGHT: 293 LBS

## 2025-05-19 DIAGNOSIS — D50.9 IRON DEFICIENCY ANEMIA, UNSPECIFIED IRON DEFICIENCY ANEMIA TYPE: ICD-10-CM

## 2025-05-19 DIAGNOSIS — T45.4X5S ADVERSE EFFECT OF IRON, SEQUELA: Primary | ICD-10-CM

## 2025-05-19 DIAGNOSIS — N92.1 MENORRHAGIA WITH IRREGULAR CYCLE: ICD-10-CM

## 2025-05-19 DIAGNOSIS — T45.4X5S ADVERSE EFFECT OF IRON, SEQUELA: ICD-10-CM

## 2025-05-19 PROCEDURE — 25010000002 IRON SUCROSE PER 1 MG: Performed by: PHYSICIAN ASSISTANT

## 2025-05-19 PROCEDURE — 25810000003 SODIUM CHLORIDE 0.9 % SOLUTION: Performed by: PHYSICIAN ASSISTANT

## 2025-05-19 PROCEDURE — 96365 THER/PROPH/DIAG IV INF INIT: CPT

## 2025-05-19 PROCEDURE — 96366 THER/PROPH/DIAG IV INF ADDON: CPT

## 2025-05-19 RX ADMIN — IRON SUCROSE 300 MG: 20 INJECTION, SOLUTION INTRAVENOUS at 09:04

## 2025-05-20 ENCOUNTER — HOSPITAL ENCOUNTER (OUTPATIENT)
Dept: INFUSION THERAPY | Facility: HOSPITAL | Age: 45
Discharge: HOME | End: 2025-05-20
Admitting: PHYSICIAN ASSISTANT
Payer: COMMERCIAL

## 2025-05-20 VITALS
TEMPERATURE: 98.6 F | HEART RATE: 90 BPM | RESPIRATION RATE: 18 BRPM | SYSTOLIC BLOOD PRESSURE: 148 MMHG | DIASTOLIC BLOOD PRESSURE: 63 MMHG | OXYGEN SATURATION: 99 %

## 2025-05-20 DIAGNOSIS — T45.4X5S ADVERSE EFFECT OF IRON, SEQUELA: Primary | ICD-10-CM

## 2025-05-20 DIAGNOSIS — N92.1 MENORRHAGIA WITH IRREGULAR CYCLE: ICD-10-CM

## 2025-05-20 DIAGNOSIS — T45.4X5S ADVERSE EFFECT OF IRON, SEQUELA: ICD-10-CM

## 2025-05-20 DIAGNOSIS — D50.9 IRON DEFICIENCY ANEMIA, UNSPECIFIED IRON DEFICIENCY ANEMIA TYPE: ICD-10-CM

## 2025-05-20 PROCEDURE — 96365 THER/PROPH/DIAG IV INF INIT: CPT

## 2025-05-20 PROCEDURE — 25010000002 IRON SUCROSE PER 1 MG: Performed by: PHYSICIAN ASSISTANT

## 2025-05-20 PROCEDURE — 25810000003 SODIUM CHLORIDE 0.9 % SOLUTION: Performed by: PHYSICIAN ASSISTANT

## 2025-05-20 RX ADMIN — SODIUM CHLORIDE 300 MG: 9 INJECTION, SOLUTION INTRAVENOUS at 08:55

## 2025-05-21 ENCOUNTER — HOSPITAL ENCOUNTER (OUTPATIENT)
Dept: INFUSION THERAPY | Facility: HOSPITAL | Age: 45
Discharge: HOME | End: 2025-05-21
Admitting: PHYSICIAN ASSISTANT
Payer: COMMERCIAL

## 2025-05-21 VITALS
RESPIRATION RATE: 20 BRPM | TEMPERATURE: 98.5 F | OXYGEN SATURATION: 99 % | SYSTOLIC BLOOD PRESSURE: 174 MMHG | HEART RATE: 78 BPM | DIASTOLIC BLOOD PRESSURE: 88 MMHG

## 2025-05-21 DIAGNOSIS — N92.1 MENORRHAGIA WITH IRREGULAR CYCLE: ICD-10-CM

## 2025-05-21 DIAGNOSIS — T45.4X5S ADVERSE EFFECT OF IRON, SEQUELA: Primary | ICD-10-CM

## 2025-05-21 DIAGNOSIS — T45.4X5S ADVERSE EFFECT OF IRON, SEQUELA: ICD-10-CM

## 2025-05-21 DIAGNOSIS — D50.9 IRON DEFICIENCY ANEMIA, UNSPECIFIED IRON DEFICIENCY ANEMIA TYPE: ICD-10-CM

## 2025-05-21 PROCEDURE — 96365 THER/PROPH/DIAG IV INF INIT: CPT

## 2025-05-21 PROCEDURE — 25810000003 SODIUM CHLORIDE 0.9 % SOLUTION: Performed by: PHYSICIAN ASSISTANT

## 2025-05-21 PROCEDURE — 96366 THER/PROPH/DIAG IV INF ADDON: CPT

## 2025-05-21 PROCEDURE — 25010000002 IRON SUCROSE PER 1 MG: Performed by: PHYSICIAN ASSISTANT

## 2025-05-21 RX ADMIN — SODIUM CHLORIDE 300 MG: 9 INJECTION, SOLUTION INTRAVENOUS at 08:56
